# Patient Record
Sex: FEMALE | Race: WHITE | NOT HISPANIC OR LATINO | Employment: FULL TIME | URBAN - METROPOLITAN AREA
[De-identification: names, ages, dates, MRNs, and addresses within clinical notes are randomized per-mention and may not be internally consistent; named-entity substitution may affect disease eponyms.]

---

## 2017-01-06 ENCOUNTER — GENERIC CONVERSION - ENCOUNTER (OUTPATIENT)
Dept: OTHER | Facility: OTHER | Age: 62
End: 2017-01-06

## 2017-01-12 ENCOUNTER — HOSPITAL ENCOUNTER (OUTPATIENT)
Dept: MAMMOGRAPHY | Facility: CLINIC | Age: 62
Discharge: HOME | End: 2017-01-12
Payer: COMMERCIAL

## 2017-01-12 ENCOUNTER — GENERIC CONVERSION - ENCOUNTER (OUTPATIENT)
Dept: OTHER | Facility: OTHER | Age: 62
End: 2017-01-12

## 2017-01-12 DIAGNOSIS — Z12.31 ENCOUNTER FOR SCREENING MAMMOGRAM FOR MALIGNANT NEOPLASM OF BREAST: ICD-10-CM

## 2017-01-12 PROCEDURE — G0202 SCR MAMMO BI INCL CAD: HCPCS

## 2017-01-13 ENCOUNTER — GENERIC CONVERSION - ENCOUNTER (OUTPATIENT)
Dept: OTHER | Facility: OTHER | Age: 62
End: 2017-01-13

## 2017-01-14 ENCOUNTER — TRANSCRIBE ORDERS (OUTPATIENT)
Dept: ADMINISTRATIVE | Facility: HOSPITAL | Age: 62
End: 2017-01-14

## 2017-01-14 ENCOUNTER — APPOINTMENT (OUTPATIENT)
Dept: LAB | Facility: CLINIC | Age: 62
End: 2017-01-14
Payer: COMMERCIAL

## 2017-01-14 DIAGNOSIS — E55.9 UNSPECIFIED VITAMIN D DEFICIENCY: ICD-10-CM

## 2017-01-14 DIAGNOSIS — E55.9 UNSPECIFIED VITAMIN D DEFICIENCY: Primary | ICD-10-CM

## 2017-01-14 DIAGNOSIS — E55.9 VITAMIN D DEFICIENCY: ICD-10-CM

## 2017-01-14 DIAGNOSIS — M85.80 OTHER SPECIFIED DISORDERS OF BONE DENSITY AND STRUCTURE, UNSPECIFIED SITE: ICD-10-CM

## 2017-01-14 LAB
25(OH)D3 SERPL-MCNC: 37.2 NG/ML (ref 30–100)
ALBUMIN SERPL BCP-MCNC: 4 G/DL (ref 3.5–5)
ALP SERPL-CCNC: 94 U/L (ref 46–116)
ALT SERPL W P-5'-P-CCNC: 28 U/L (ref 12–78)
ANION GAP SERPL CALCULATED.3IONS-SCNC: 5 MMOL/L (ref 4–13)
AST SERPL W P-5'-P-CCNC: 10 U/L (ref 5–45)
BILIRUB SERPL-MCNC: 0.25 MG/DL (ref 0.2–1)
BUN SERPL-MCNC: 12 MG/DL (ref 5–25)
CALCIUM SERPL-MCNC: 8.9 MG/DL (ref 8.3–10.1)
CHLORIDE SERPL-SCNC: 107 MMOL/L (ref 100–108)
CO2 SERPL-SCNC: 28 MMOL/L (ref 21–32)
CREAT SERPL-MCNC: 0.72 MG/DL (ref 0.6–1.3)
GFR SERPL CREATININE-BSD FRML MDRD: >60 ML/MIN/1.73SQ M
GLUCOSE SERPL-MCNC: 81 MG/DL (ref 65–140)
POTASSIUM SERPL-SCNC: 3.8 MMOL/L (ref 3.5–5.3)
PROT SERPL-MCNC: 7 G/DL (ref 6.4–8.2)
PTH-INTACT SERPL-MCNC: 25.6 PG/ML (ref 14–72)
SODIUM SERPL-SCNC: 140 MMOL/L (ref 136–145)

## 2017-01-14 PROCEDURE — 36415 COLL VENOUS BLD VENIPUNCTURE: CPT

## 2017-01-14 PROCEDURE — 83970 ASSAY OF PARATHORMONE: CPT

## 2017-01-14 PROCEDURE — 82306 VITAMIN D 25 HYDROXY: CPT

## 2017-01-14 PROCEDURE — 80053 COMPREHEN METABOLIC PANEL: CPT

## 2017-02-10 ENCOUNTER — GENERIC CONVERSION - ENCOUNTER (OUTPATIENT)
Dept: OTHER | Facility: OTHER | Age: 62
End: 2017-02-10

## 2017-04-12 ENCOUNTER — GENERIC CONVERSION - ENCOUNTER (OUTPATIENT)
Dept: OTHER | Facility: OTHER | Age: 62
End: 2017-04-12

## 2017-06-02 ENCOUNTER — GENERIC CONVERSION - ENCOUNTER (OUTPATIENT)
Dept: OTHER | Facility: OTHER | Age: 62
End: 2017-06-02

## 2017-06-10 ENCOUNTER — LAB CONVERSION - ENCOUNTER (OUTPATIENT)
Dept: OTHER | Facility: OTHER | Age: 62
End: 2017-06-10

## 2017-06-10 LAB
25(OH)D3 SERPL-MCNC: 50 NG/ML (ref 30–100)
T4 FREE SERPL-MCNC: 1 NG/DL (ref 0.8–1.8)
TSH SERPL DL<=0.05 MIU/L-ACNC: 4.67 MIU/L (ref 0.4–4.5)

## 2017-06-26 ENCOUNTER — ALLSCRIPTS OFFICE VISIT (OUTPATIENT)
Dept: OTHER | Facility: OTHER | Age: 62
End: 2017-06-26

## 2017-09-21 ENCOUNTER — LAB CONVERSION - ENCOUNTER (OUTPATIENT)
Dept: OTHER | Facility: OTHER | Age: 62
End: 2017-09-21

## 2017-09-21 LAB — TSH SERPL DL<=0.05 MIU/L-ACNC: 3.99 MIU/L (ref 0.4–4.5)

## 2017-10-02 ENCOUNTER — ALLSCRIPTS OFFICE VISIT (OUTPATIENT)
Dept: OTHER | Facility: OTHER | Age: 62
End: 2017-10-02

## 2017-10-03 NOTE — PROGRESS NOTES
Assessment  1  Hyperlipidemia (272 4) (E78 5)   2  Hypertension (401 9) (I10)   3  Hypothyroidism (244 9) (E03 9)   4  Osteopenia (733 90) (M85 80)   5  Rosacea (695 3) (L71 9)   6  Vitamin D deficiency (268 9) (E55 9)   7  Headache (784 0) (R51)    Plan  Encounter for screening mammogram for malignant neoplasm of breast    · * MAMMO SCREENING BILATERAL W CAD; Status:Hold For - Scheduling; Requested  for:12Jan2018;   Hyperlipidemia    · (1) LIPID PANEL, FASTING; Status:Active; Requested for:27Mar2018;   Hypertension    · (1) CBC/PLT/DIFF; Status:Active; Requested for:27Mar2018;    · (1) COMPREHENSIVE METABOLIC PANEL; Status:Active; Requested for:27Mar2018;    · (1) URINALYSIS (will reflex a microscopy if leukocytes, occult blood, protein or nitrites are  not within normal limits); Status:Active; Requested for:27Mar2018;   Hypothyroidism    · (1) TSH WITH FT4 REFLEX; Status:Active; Requested for:27Mar2018;   Need for prophylactic vaccination and inoculation against influenza    · Fluzone Quadrivalent Intramuscular Suspension  Vitamin D deficiency    · (1) VITAMIN D 25-HYDROXY; Status:Active;  Requested for:27Mar2018;      Hypertension  Little bit high today but overall has been very good with verapamil diet and exercise    Reflux/cough  Controlled with Nexium    Hypothyroid  TSH is normal  Continue 75 MCG's daily for the next year      Chronic daily headache/and migraine  Patient is on Trokendi for prophylaxis and sumatriptan for rescue    Hyperlipidemia  Continue atorvastatin    Rosacea  Continue Minocin    Osteopenia  On calcium daily, exercise daily and vitamin D at thousand units daily  Last DEXA was in January 2017       Patient's next colonoscopy is due August 2019        Recheck in 6 months EKG that day  Mammogram prior on May 12, 2018 are later  Fasting blood work a week prior  [employer] sooner if needed      Chief Complaint  Pt presents here for a 6mth recheck for htn, hypothyroidism, lipids and vit d def;  due 08/2019done 12/2016due 11/2017due 01/12/2018due 09/2018      History of Present Illness  Patient is here for her recheck on her blood pressure at her thyroid blood work  days ago she fell while walking her dogs  Together they were 180 pounds  Older forwarded hurt her arm and her knee but she is okay      Review of Systems  Constitutional  No fever chills no fatigue no weight loss no weight gain    Mental status  No anxiety or depression and no sleep disturbances no changes in personality or emotional problems no suicidal or homicidal ideations    Eyes  No eye pain no red eyes no visual disturbances no discharge no eye itch    ENT  No earache no hearing loss nasal discharge no sore throat no hoarseness no postnasal drip     Cardio  No chest pain no palpitations no leg edema no claudication no dyspnea on exertion no nocturnal dyspnea    Respiratory  No shortness of breath or wheeze no cough no orthopnea no dyspnea on exertion no hemoptysis no sputum production    GI  No abdominal pain no nausea no vomiting no diarrhea or constipation no bloody stools no change in bowel habits no change in weight      no dysuria hematuria no pyuria no incontinence no pelvic pain    Musculoskeletal  No myalgias arthralgias no joint swelling or stiffness no limb pain or swelling    Skin  No rashes or lesions no itchiness and no wounds    Neuro  No headache dizziness lightheadedness syncope numbness paresthesias or confusion    Heme  No swollen glands no easy bruising        Active Problems  1  Atrophy of vagina (627 3) (N95 2)   2  Dyspareunia (625 0)   3  Encounter for routine gynecological examination with Papanicolaou smear of cervix   (V72 31,V76 2) (Z01 419)   4  Encounter for screening mammogram for malignant neoplasm of breast (V76 12)   (Z12 31)   5  Headache (784 0) (R51)   6  Hyperlipidemia (272 4) (E78 5)   7  Hypertension (401 9) (I10)   8  Hypothyroidism (244 9) (E03 9)   9   Need for prophylactic vaccination and inoculation against influenza (V04 81) (Z23)   10  Osteopenia (733 90) (M85 80)   11  Rosacea (695 3) (L71 9)   12  Vitamin D deficiency (268 9) (E55 9)    Past Medical History  1  History of Colonoscopy (Fiberoptic) Screening   2  History of Cough (786 2) (R05)   3  History of Decrease In Consciousness (780 09)   4  History of Dysuria (788 1) (R30 0)   5  History of Encounter for routine gynecological examination (V72 31) (Z01 419)   6  History of Encounter for routine gynecological examination (V72 31) (Z01 419)   7  History of Endometrial hyperplasia (621 30) (N85 00)   8  History of Foot Pain (Soft Tissue) (729 5)   9  History of Gastric ulcer (531 90) (K25 9)   10  History of  3 (V22 2) (Z33 1)   11  History of constipation (V12 79) (Z87 19)   12  History of fatigue (V13 89) (Z87 898)   13  History of fatigue (V13 89) (Z87 898)   14  History of hyperlipidemia (V12 29) (Z86 39)   15  History of hypertension (V12 59) (Z86 79)   16  History of migraine (V12 49) (Z86 69)   17  History of senile atrophic vaginitis (V13 29) (Z87 42)   18  History of Knee pain (719 46) (M25 569)   19  History of Mild cervical dysplasia (622 11) (N87 0)   20  History of Moderate dysplasia of cervix (KORTNEY II) (622 12) (N87 1)   21  History of Simple endometrial hyperplasia without atypia (621 31) (N85 01)   22  History of Strain (848 9)    Surgical History  1  History of Appendectomy   2  History of Cervix Cryosurgery   3  History of Diagnostic Esophagogastroduodenoscopy   4  History of Dilation And Curettage Of Cervical Stump   5  History of Knee Arthroscopy (Therapeutic)   6  History of Tubal Ligation    Family History  Mother    1  Family history of Asthma (V17 5)   2  Family history of Hypertension (V17 49)  Father    3  Family history of Alzheimer Disease   4  Family history of Diabetes Mellitus (V18 0)  Family History    5  Family history of Acute Myocardial Infarction (V17 3)   6  Family history of Alzheimer Disease   7   Family history of Asthma (V17 5)   8  Family history of Diabetes Mellitus (V18 0)   9  Family history of Hypertension (V17 49)   10  Family history of Reported Family History Of Ischemic Heart Disease    The family history was reviewed and updated today  Social History   · Always uses seat belt   · Being A Social Drinker   · Drinks caffeinated tea   ·    · Never a smoker   · No drug use   · No Yazidi beliefs   · Three children  The social history was reviewed and updated today  Current Meds   1  Atorvastatin Calcium 10 MG Oral Tablet; TAKE 1 TABLET BY MOUTH EVERY DAY AT   BEDTIME; Therapy: 74PWU6473 to (Last ML:26WRT1203)  Requested for: 26Jun2017 Ordered   2  Calcium 1200 3265-0747 MG-UNIT Oral Tablet Chewable; Take 1 tablet daily; Therapy: (Recorded:54Fai4427) to Recorded   3  Minocycline HCl - 100 MG Oral Capsule; take 1 capsule daily; Therapy: 91KJT3553 to (Evaluate:18Feb2018)  Requested for: 54Pft5755; Last   Rx:74Jyh1940 Ordered   4  NexIUM 40 MG Oral Capsule Delayed Release; TAKE 1 CAPSULE DAILY AS NEEDED    Requested for: 26Jun2017; Last JY:19IMU3583 Ordered   5  Omega-3 Fish Oil CAPS; take 1 capsule daily; Therapy: (Recorded:61Vnl3637) to Recorded   6  SUMAtriptan 20 MG/ACT Nasal Solution; Therapy: 90CLG3327 to Recorded   7  SUMAtriptan Succinate 100 MG Oral Tablet; Therapy: 86VSN9579 to (Evaluate:04Jun2015)  Requested for: 69ULJ7561 Recorded   8  Synthroid 75 MCG Oral Tablet; TAKE 1 TABLET DAILY; Therapy: 62EWY8896 to (Evaluate:16Sep2018)  Requested for: 65Cuo9882; Last   Rx:44Luk0942 Ordered   9  Trokendi  MG Oral Capsule Extended Release 24 Hour; take 1 capsule daily; Therapy: (Zakia Myrick) to Recorded   10  Verapamil HCl  MG Oral Capsule Extended Release 24 Hour; TAKE 1 CAPSULE    DAILY *MYLANMFR*;    Therapy: 23XZV6389 to (Evaluate:26Mar2018)  Requested for: 85QQP6251; Last    Rx:31Mar2017 Ordered   11  Vitamin D 1000 UNIT CAPS; TAKE 1 CAPSULE Daily;     Therapy: (253 3924) to Recorded   12  Womens One Daily TABS; TAKE 1 TABLET DAILY; Therapy: (Recorded:28Nov2016) to Recorded   13  Zoloft 50 MG Oral Tablet; Therapy: (Recorded:02Sep2016) to Recorded    The medication list was reviewed and updated today  Allergies  1  Midrin CAPS   2  Lisinopril TABS  3  Banana   4  Seasonal    Vitals  Vital Signs    Recorded: 01TIX5811 07:43AM   Heart Rate 68   Respiration 14   Systolic 549, RUE, Sitting   Diastolic 80, RUE, Sitting   Height 5 ft 1 in   Weight 158 lb 8 0 oz   BMI Calculated 29 95   BSA Calculated 1 71     Physical Exam  Constitutional  Appears healthy, Looks well, Appearance consistent with age    Mental Status  Alert, Oriented, Cooperative, Memory function normal , clean, and reasonable    Neck  No neck mass, No thyromegaly, Good carotid upstrokes bilaterally, trachea midline positive click    Respiratory  Breath sounds normal, No rales, No rhonchi, No wheezing, normal palpation    Cardiac   Regular rhythm without ectopy or murmur no S3-S4, no heave lift or thrill to palpation    Vascular  No leg edema, No pedal edema    Muscular skeletal  No clubbing cyanosis , muscle tone normal    Skin  No appreciable rashes or abnormal appearing lesions        Health Management  Encounter for screening mammogram for malignant neoplasm of breast   * MAMMO SCREENING BILATERAL W CAD; every 1 year; Last 20QFV3601; Next Due: 12Jan2018; Active  History of Colonoscopy (Fiberoptic) Screening   COLONOSCOPY; every 10 years; Last 87Psk5502; Next Due: 40Nqx9510; Active  Hypertension   EKG/ECG- POC; every 1 year; Last 21PVR3268; Next Due: 50QMG1402; Near Due    Future Appointments    Date/Time Provider Specialty Site   11/02/2017 08:15 AM ANNIE Coppola   [facility]st[facility]   Electronically signed by : Brayan Mckinnon DO; Oct  2 2017  8:05AM EST                       (Author)

## 2017-10-11 ENCOUNTER — GENERIC CONVERSION - ENCOUNTER (OUTPATIENT)
Dept: OTHER | Facility: OTHER | Age: 62
End: 2017-10-11

## 2017-11-02 ENCOUNTER — ALLSCRIPTS OFFICE VISIT (OUTPATIENT)
Dept: OTHER | Facility: OTHER | Age: 62
End: 2017-11-02

## 2017-11-03 NOTE — PROGRESS NOTES
Assessment  1  Atrophy of vagina (627 3) (N95 2)   2  Dyspareunia (625 0)   3  Osteopenia (733 90) (M85 80)   4  Encounter for screening mammogram for malignant neoplasm of breast (V76 12)   (Z12 31)   5  Encounter for routine gynecological examination (V72 31) (Z01 419)    Plan  Encounter for routine gynecological examination    · Follow-up visit in 1 year Evaluation and Treatment  Follow-up  Status: Hold For -  Scheduling  Requested for: 50PNI3344   Ordered; For: Encounter for routine gynecological examination; Ordered By: Vito Hoang Performed:  Due: 91HZR0525  Encounter for screening mammogram for malignant neoplasm of breast    · * MAMMO SCREENING BILATERAL W CAD; Status:Hold For - Scheduling; Requested  for:12Jan2018; Perform:[facility]; YYN:03FLE2764;QGZIZAY;UIE:QKFTYZOYE for screening mammogram for malignant neoplasm of breast; Ordered By:Bre Sharma; Discussion/Summary    #1  Yearly exam-Pap smear deferred, self breast awareness reviewed, calcium/vitamin D recommendations reviewed, mammogram request given, colonoscopy followup as per specialist History of mild to moderate dysplasia-this was back in 2005  Recent Pap smear with HPV testing from June 2014 was negative with negative Pap/HPV September 2016  Pap smear was deferred as per current guidelines with patient consentHistory of cystic hyperplasia of the endometrium-this is noted on biopsy November 2004  No atypical changes were noted year the patient was urged to call with any abnormal bleeding/postmenopausal bleeding  History of osteopenia-DEXA scan from October 2014 and repeat December 2016 demonstrated continued osteopenia with only slight decrease in bone density of 3%  Risk of major osteoporotic fracture 9 4% with hip fracture risk of 1 1% was noted, below recommendations for medical treatment  Recommend calcium, vitamin-D, weight-bearing exercise   Recommend repeat DEXA scan around December 2018Dyspareunia with atrophic vaginitis-improved with use of lubrication  She states that her  has medical issues and they are not really sexually active at this time  She declines any intervention  Other-patient with migraine headache today, likely resulting in her blood pressure being elevated  Suggested follow up with primary doctor in the near future  she will followup in one year or as needed  The patient has the current Goals: Reviewed  The patent has the current Barriers: None  Patient is able to Self-Care  PATIENT EDUCATION RECORD She was given the following educational materials: Calcium/vitamin-D sheet   Chief Complaint  1  Visit For: Preventive General Multisystem Exam    History of Present Illness  HPI: Patient was seen today for yearly exam  Please see assessment plan for details      Review of Systems    Constitutional: No fever, no chills, feels well, no tiredness, no recent weight gain or loss  ENT: no ear ache, no loss of hearing, no nosebleeds or nasal discharge, no sore throat or hoarseness  Cardiovascular: no complaints of slow or fast heart rate, no chest pain, no palpitations, no leg claudication or lower extremity edema  Respiratory: no complaints of shortness of breath, no wheezing, no dyspnea on exertion, no orthopnea or PND  Breasts: no complaints of breast pain, breast lump or nipple discharge  Gastrointestinal: no complaints of abdominal pain, no constipation, no nausea or diarrhea, no vomiting, no bloody stools  Genitourinary: no complaints of dysuria, no incontinence, no pelvic pain, no dysmenorrhea, no vaginal discharge or abnormal vaginal bleeding  Musculoskeletal: no complaints of arthralgia, no myalgia, no joint swelling or stiffness, no limb pain or swelling  Integumentary: no complaints of skin rash or lesion, no itching or dry skin, no skin wounds  Neurological: no complaints of headache, no confusion, no numbness or tingling, no dizziness or fainting  ROS reviewed  Active Problems  1  Atrophy of vagina (627 3) (N95 2)   2  Dyspareunia (625 0)   3  Encounter for routine gynecological examination with Papanicolaou smear of cervix   (V72 31,V76 2) (Z01 419)   4  Encounter for screening mammogram for malignant neoplasm of breast (V76 12)   (Z12 31)   5  Headache (784 0) (R51)   6  Hyperlipidemia (272 4) (E78 5)   7  Hypertension (401 9) (I10)   8  Hypothyroidism (244 9) (E03 9)   9  Need for prophylactic vaccination and inoculation against influenza (V04 81) (Z23)   10  Osteopenia (733 90) (M85 80)   11  Rosacea (695 3) (L71 9)   12  Vitamin D deficiency (268 9) (E55 9)    Past Medical History   · History of Colonoscopy (Fiberoptic) Screening   · History of Cough (786 2) (R05)   · History of Decrease In Consciousness (780 09)   · History of Dysuria (788 1) (R30 0)   · History of Encounter for routine gynecological examination (V72 31) (Z01 419)   · History of Endometrial hyperplasia (621 30) (N85 00)   · History of Foot Pain (Soft Tissue) (729 5)   · History of Gastric ulcer (531 90) (K25 9)   · History of  3 (V22 2) (Z33 1)   · History of constipation (V12 79) (Z87 19)   · History of fatigue (V13 89) (Z88 092)   · History of fatigue (V13 89) (W57 137)   · History of hyperlipidemia (V12 29) (Z86 39)   · History of hypertension (V12 59) (Z86 79)   · History of migraine (V12 49) (Z86 69)   · History of senile atrophic vaginitis (V13 29) (Z87 42)   · History of Knee pain (719 46) (M25 569)   · History of Mild cervical dysplasia (622 11) (N87 0)   · History of Moderate dysplasia of cervix (KORTNEY II) (622 12) (N87 1)   · History of Simple endometrial hyperplasia without atypia (621 31) (N85 01)   · History of Strain (848 9)    The active problems and past medical history were reviewed and updated today        Surgical History   · History of Appendectomy   · History of Cervix Cryosurgery   · History of Diagnostic Esophagogastroduodenoscopy   · History of Dilation And Curettage Of Cervical Stump   · History of Knee Arthroscopy (Therapeutic)   · History of Tubal Ligation    The surgical history was reviewed and updated today  Family History  Mother    · Family history of Asthma (V17 5)   · Family history of Hypertension (V17 49)  Father    · Family history of Alzheimer Disease   · Family history of Diabetes Mellitus (V18 0)  Family History    · Family history of Acute Myocardial Infarction (V17 3)   · Family history of Alzheimer Disease   · Family history of Asthma (V17 5)   · Family history of Diabetes Mellitus (V18 0)   · Family history of Hypertension (V17 49)   · Family history of Reported Family History Of Ischemic Heart Disease    The family history was reviewed and updated today  Social History   · Always uses seat belt   · Being A Social Drinker   · Drinks caffeinated tea   · Drinks 1 cup of hot tea a day  Drinks 2 oz glasses of iced tea a day  ·    · Never a smoker   · No drug use   · No Spiritism beliefs   · Three children  The social history was reviewed and updated today  The social history was reviewed and is unchanged  Current Meds   1  Atorvastatin Calcium 10 MG Oral Tablet; TAKE 1 TABLET BY MOUTH EVERY DAY AT   BEDTIME; Therapy: 29IEB7782 to (Last VY:26LQZ7155)  Requested for: 26Jun2017 Ordered   2  Calcium 1200 5198-4261 MG-UNIT Oral Tablet Chewable; Take 1 tablet daily; Therapy: (Recorded:90Mkw4441) to Recorded   3  Minocycline HCl - 100 MG Oral Capsule; take 1 capsule daily; Therapy: 24OVX1478 to (Evaluate:18Feb2018)  Requested for: 55Gny2676; Last   Rx:71Omw9045 Ordered   4  NexIUM 40 MG Oral Capsule Delayed Release; TAKE 1 CAPSULE DAILY AS NEEDED    Requested for: 26Jun2017; Last VT:13MMD5806 Ordered   5  Omega-3 Fish Oil CAPS; take 1 capsule daily; Therapy: (Recorded:74Iza3621) to Recorded   6  SUMAtriptan 20 MG/ACT Nasal Solution; Therapy: 73LCK2905 to Recorded   7  SUMAtriptan Succinate 100 MG Oral Tablet;    Therapy: 77GYG8154 to (Evaluate:04Jun2015)  Requested for: 15JAI1799 Recorded   8  Synthroid 75 MCG Oral Tablet; TAKE 1 TABLET DAILY; Therapy: 83QEU6367 to (Evaluate:51Kee0847)  Requested for: 75Vrf7363; Last   Rx:21Sep2017 Ordered   9  Trokendi  MG Oral Capsule Extended Release 24 Hour; take 1 capsule daily; Therapy: (Chana Aparicio) to Recorded   10  Verapamil HCl  MG Oral Capsule Extended Release 24 Hour; TAKE 1 CAPSULE    DAILY *MYLANMFR*;    Therapy: 39NVN8041 to (Evaluate:26Mar2018)  Requested for: 75KUY1425; Last    Rx:31Mar2017 Ordered   11  Vitamin D 1000 UNIT CAPS; TAKE 1 CAPSULE Daily; Therapy: (794 1672) to Recorded   12  Womens One Daily TABS; TAKE 1 TABLET DAILY; Therapy: (Recorded:28Nov2016) to Recorded   13  Zoloft 50 MG Oral Tablet; Therapy: (Recorded:02Sep2016) to Recorded    Allergies  1  Midrin CAPS   2  Lisinopril TABS  3  Banana   4  Seasonal    Vitals   Recorded: 84CWX1297 19:01OT   Systolic 941, LUE, Sitting   Diastolic 92, LUE, Sitting   Height 5 ft 1 in   Weight 161 lb 2 oz   BMI Calculated 30 44   BSA Calculated 1 72   LMP POSTMENOPAUSAL     Physical Exam    Constitutional   General appearance: No acute distress, well appearing and well nourished  Neck   Neck: Normal, supple, trachea midline, no masses  Thyroid: Normal, no thyromegaly  Genitourinary   External genitalia: Normal and no lesions appreciated  Vagina: Normal, no lesions or dryness appreciated  -- Mild to moderate atrophy, without erythema or lesions or discharge  Urethra: Normal     Urethral meatus: Normal     Bladder: Normal, soft, non-tender and no prolapse or masses appreciated  Cervix: Normal, no palpable masses  -- Mildly atrophic, without lesions or discharge or cervicitis  No Cervical motion tenderness  Uterus: Normal, non-tender, not enlarged, and no palpable masses  -- Anteverted, top-normal size, nontender, without mass     Adnexa/parametria: Normal, non-tender and no fullness or masses appreciated  Anus, perineum, and rectum: Normal sphincter tone, no masses, and no prolapse  Chest   Breasts: Normal and no dimpling or skin changes noted  Abdomen   Abdomen: Normal, non-tender, and no organomegaly noted  Liver and spleen: No hepatomegaly or splenomegaly  Examination for hernias: No hernias appreciated  Lymphatic   Palpation of lymph nodes in neck, axillae, groin and/or other locations: No lymphadenopathy or masses noted  Skin   Skin and subcutaneous tissue: Normal skin turgor and no rashes      Palpation of skin and subcutaneous tissue: Normal     Psychiatric   Orientation to person, place, and time: Normal     Mood and affect: Normal        Future Appointments    Date/Time Provider Specialty Site   04/02/2018 07:30 AM Justin Shah DO Family Medicine [address] [place] [address] Drive     Signatures   Electronically signed by : ANNIE Herr ; Nov 2 2017  8:39AM EST                       (Author)

## 2018-01-10 NOTE — PROGRESS NOTES
Assessment    1  Allergic rhinitis (477 9) (J30 9)   2  Headache (784 0) (R51)   3  Hyperlipidemia (272 4) (E78 5)   4  Hypertension (401 9) (I10)   5  Hypothyroidism (244 9) (E03 9)   6  Osteopenia (733 90) (M85 80)   7  Vitamin D deficiency (268 9) (E55 9)   8  Sore throat (462) (J02 9)    Plan  Hypothyroidism    · From  Synthroid 25 MCG Oral Tablet TAKE 1 TABLET DAILY To Synthroid 50  MCG Oral Tablet (Levothyroxine Sodium) TAKE 1 TABLET DAILY   · (1) TSH WITH FT4 REFLEX; Status:Active; Requested for:97Wyn1866; Sore throat    · Follow-up visit in 6 months Evaluation and Treatment  Follow-up EKG that day  Status:  Hold For - Scheduling  Requested for: 86EXW4632     #1 patient's cough it turns out to be reflux secondary to  stricture  She is now on Nexium and will have an EGD tomorrow for dilation of her esophagus  Patient has a sore throat now, and I asked her to call if this does not improve after her stricture is relieved  It should start improving in the next one or 2 weeks  If it doesn't seem to happen she should call her ear nose and throat  #2 patient's blood pressure is very good and stable with verapamil only  We will continue the same    #3 low work shows that her thyroid needs to be bumped up from 25-50 Âµg daily  Patient will do this in 3 months have her TSH done  We will call her with the results in a new prescription for Synthroid  #4 chronic daily headache and migraine headaches-sees neurology and is currently trying Trokendi and has sumatriptan as a rescue    #5 cholesterol is on simvastatin and diet  Her cholesterol numbers are good other than HDL because of not being able to walk as above  #6 rosacea uses Minocin    #7 osteopenia and vitamin D and calcium daily-again    Hopefully over time will go away so she can exercise to help build her bones  Last DEXA October 2014   We can check these every 3 or 4 years     Patient's next colonoscopy is due August 2019  Mammogram on her after December 11, 2016  Next Pap test like 2016 with Dr Rebecca Benítez    [facility] months [facility] the [facility]  Recheck in 6 months EKG that day to check blood pressure      Chief Complaint  Pt presents here for a recheck for htn, hypothyroidism, lipids and vit d def;    colon due 08/2019  EKG due 08/2016  mammo due 12/11/2016  pap due 06/2015, pt will schedule      History of Present Illness  Patient is here for six-month recheck  In the meantime for her cough she did see ear nose and throat and they recommended Nexium instead of omeprazole  As a result her cough definitely improved  Ear nose and throat wanted her to see GI  She had a stricture in her esophagus  She did that and actually had a barium swallow which showed a 50% stricture at her GE junction and retropulsion of the barium  Patient feels thickness and pain in her submandibular area and also up into her TM area  Tomorrow she is scheduled for the EGD with dilation  Patient also complaining of extreme fatigue but has been doing a lot of overtime  As a result she hasn't been able to walk like she usually has other than once or twice a week usually on the weekend because her  works        Review of Systems   Constitutional  No fever chills fatigue as above no weight loss no weight gain    Mental status  No anxiety or depression and no sleep disturbances no changes in personality or emotional problems no suicidal or homicidal ideations    Eyes  No eye pain no red eyes no visual disturbances no discharge no eye itch    ENT  No earache no hearing loss nasal discharge sore throat as above no hoarseness no postnasal drip     Cardio  No chest pain no palpitations no leg edema no claudication no dyspnea on exertion no nocturnal dyspnea    Respiratory  No shortness of breath or wheeze no cough no orthopnea no dyspnea on exertion no hemoptysis no sputum production    GI  No abdominal pain no nausea no vomiting no diarrhea or constipation no bloody stools no change in bowel habits no change in weight      no dysuria hematuria no pyuria no incontinence no pelvic pain    Musculoskeletal  No myalgias arthralgias no joint swelling or stiffness no limb pain or swelling    Skin  No rashes or lesions no itchiness and no wounds    Neuro  No headache dizziness lightheadedness syncope numbness paresthesias or confusion    Heme  No swollen glands no easy bruising        Active Problems    1  Allergic rhinitis (477 9) (J30 9)   2  Atrophy of vagina (627 3) (N95 2)   3  Dyspareunia (625 0)   4  Encounter for routine gynecological examination () (Z01 419)   5  Encounter for screening mammogram for malignant neoplasm of breast (V76 12)   (Z12 31)   6  Headache (784 0) (R51)   7  History of self breast exam   8  Hyperlipidemia (272 4) (E78 5)   9  Hypertension (401 9) (I10)   10  Hypothyroidism (244 9) (E03 9)   11  Need for prophylactic vaccination and inoculation against influenza (V04 81) (Z23)   12  Osteopenia (733 90) (M85 80)   13  Rosacea (695 3) (L71 9)   14  Vitamin D deficiency (268 9) (E55 9)    Past Medical History    1  History of Colonoscopy (Fiberoptic) Screening   2  History of Cough (786 2) (R05)   3  History of Decrease In Consciousness (780 09)   4  History of Dysuria (788 1) (R30 0)   5  History of Encounter for routine gynecological examination () (Z01 419)   6  History of Endometrial hyperplasia (621 30) (N85 00)   7  History of Foot Pain (Soft Tissue) (729 5)   8  History of Gastric ulcer (531 90) (K25 9)   9  History of  3 (V22 2) (Z33 1)   10  History of constipation (V12 79) (Z87 19)   11  History of fatigue (V13 89) (Z87 898)   12  History of fatigue (V13 89) (Z87 898)   13  History of hyperlipidemia (V12 29) (Z86 39)   14  History of hypertension (V12 59) (Z86 79)   15  History of migraine (V12 49) (Z86 69)   16  History of senile atrophic vaginitis (V13 29) (Z87 42)   17  History of Knee pain (719 46) (M25 569)   18   History of Mild cervical dysplasia (622 11) (N87 0)   19  History of Moderate dysplasia of cervix (KORTNEY II) (622 12) (N87 1)   20  History of Simple endometrial hyperplasia without atypia (621 31) (N85 01)   21  History of Strain (848 9)    Surgical History    1  History of Appendectomy   2  History of Cervix Cryosurgery   3  History of Diagnostic Esophagogastroduodenoscopy   4  History of Dilation And Curettage Of Cervical Stump   5  History of Knee Arthroscopy   6  History of Tubal Ligation    Family History  Mother    1  Family history of Asthma (V17 5)   2  Family history of Hypertension (V17 49)  Father    3  Family history of Alzheimer Disease   4  Family history of Diabetes Mellitus (V18 0)  Family History    5  Family history of Acute Myocardial Infarction (V17 3)   6  Family history of Alzheimer Disease   7  Family history of Asthma (V17 5)   8  Family history of Diabetes Mellitus (V18 0)   9  Family history of Hypertension (V17 49)   10  Family history of Reported Family History Of Ischemic Heart Disease    Social History    · Always uses seat belt   · Being A Social Drinker   · Drinks caffeinated tea   ·    · Never a smoker   · No drug use   · No Anabaptism beliefs   · Three children  The social history was reviewed and updated today  The social history was reviewed and is unchanged  Current Meds   1  Calcium 1200 2036-8046 MG-UNIT Oral Tablet Chewable; Take 1 tablet daily; Therapy: (Recorded:91Qns2011) to Recorded   2  Minocycline HCl - 100 MG Oral Capsule; take 1 capsule daily; Therapy: 66GDJ9836 to (Evaluate:17Gxk5846)  Requested for: 34MTA6625; Last   Rx:82Pnk6634 Ordered   3  NexIUM 40 MG Oral Capsule Delayed Release; TAKE 1 CAPSULE DAILY AS NEEDED; Therapy: (Recorded:48Giw8418) to Recorded   4  [place]-3 Fish Oil CAPS; take 1 capsule daily; Therapy: (Recorded:65Rdm2931) to Recorded   5  Simvastatin 20 MG Oral Tablet; TAKE 1 TABLET DAILY AT BEDTIME  Requested for:   74Vbh0880; Last Rx:62Kom2166 Ordered   6   SUMAtriptan 20 MG/ACT Nasal Solution; Therapy: 94GLI2526 to Recorded   7  SUMAtriptan Succinate 100 MG Oral Tablet; Therapy: 95QLS0290 to (Evaluate:04Jun2015)  Requested for: 23JRU3377 Recorded   8  Synthroid 25 MCG Oral Tablet; TAKE 1 TABLET DAILY; Therapy: 44TRZ1692 to (Evaluate:52Jxw7044)  Requested for: 77Eck0338; Last   Rx:09Exq1718 Ordered   9  Trokendi  MG Oral Capsule Extended Release 24 Hour; take 1 capsule daily; Therapy: (Johnella Foots) to Recorded   10  Verapamil HCl  MG Oral Capsule Extended Release 24 Hour; TAKE 1 CAPSULE    DAILY  Requested for: 36Akr3548; Last Rx:07Brj0228 Ordered   11  Vitamin D 1000 UNIT CAPS; TAKE 1 CAPSULE Daily; Therapy: (Recorded:06Mar2015) to Recorded    The medication list was reviewed and updated today  Allergies    1  Midrin CAPS   2  Lisinopril TABS    3  Banana   4   Seasonal    Vitals  Vital Signs [Data Includes: Current Encounter]    Recorded: [id number] 07:33AM   Heart Rate 84   Respiration 14   Systolic 333, RUE, Sitting   Diastolic 76, RUE, Sitting   Height 5 ft 1 5 in   Weight 163 lb 12 8 oz   BMI Calculated 30 45   BSA Calculated 1 75     Physical Exam   Constitutional  Appears healthy, Looks well, Appearance consistent with age    Mental Status  Alert, Oriented, Cooperative, Memory function normal , clean    Neck  No neck mass, No thyromegaly, Good carotid upstrokes bilaterally, trachea midline positive click    Respiratory  Breath sounds normal, No rales, No rhonchi, No wheezing, normal palpation    Cardiac   Regular rhythm without ectopy or murmur no S3-S4, no heave lift or thrill to palpation    Vascular  No leg edema, No pedal edema    Muscular skeletal  No clubbing cyanosis , muscle tone normal    Skin  No appreciable rashes or lesions        Results/Data  Results   (1) LIPID PANEL, FASTING 96YLM3556 06:06AM Jan Benjamin     Test Name Result Flag Reference   CHOLESTEROL, TOTAL 177 mg/dL  125-200   HDL CHOLESTEROL 43 mg/dL L > OR = 46 TRIGLICERIDES 766 mg/dL  <150   LDL-CHOLESTEROL 111 mg/dL (calc)  <130   Desirable range <100 mg/dL for patients with CHD or  diabetes and <70 mg/dL for diabetic patients with  known heart disease  CHOL/HDLC RATIO 4 1 (calc)  < OR = 5 0   NON HDL CHOLESTEROL 134 mg/dL (calc)     Target for non-HDL cholesterol is 30 mg/dL higher than   LDL cholesterol target  (1) CBC/PLT/DIFF 22BCH7406 06:06AM [facility]     Test Name Result Flag Reference   WHITE BLOOD CELL COUNT 6 3 Thousand/uL  3 8-10 8   RED BLOOD CELL COUNT 4 69 Million/uL  3 80-5 10   HEMOGLOBIN 14 1 g/dL  11 7-15 5   HEMATOCRIT 42 3 %  35 0-45 0   MCV 90 3 fL  80 0-100 0   MCH 30 1 pg  27 0-33 0   MCHC 33 4 g/dL  32 0-36 0   RDW 13 2 %  11 0-15 0   PLATELET COUNT 581 Thousand/uL  140-400   MPV 9 1 fL  7 5-11 5   ABSOLUTE NEUTROPHILS 2993 cells/uL  8355-3255   ABSOLUTE LYMPHOCYTES 2703 cells/uL  850-3900   ABSOLUTE MONOCYTES 573 cells/uL  200-950   ABSOLUTE EOSINOPHILS 0 cells/uL L    ABSOLUTE BASOPHILS 32 cells/uL  0-200   NEUTROPHILS 47 5 %     LYMPHOCYTES 42 9 %     MONOCYTES 9 1 %     EOSINOPHILS 0 0 %     BASOPHILS 0 5 %       (1) COMPREHENSIVE METABOLIC PANEL 09BDD9480 81:96VC [facility]     Test Name Result Flag Reference   GLUCOSE 93 mg/dL  65-99   Fasting reference interval   UREA NITROGEN (BUN) 16 mg/dL  7-25   CREATININE 0 70 mg/dL  0 50-0 99   For patients >52years of age, the reference limit  for Creatinine is approximately 13% higher for people  identified as -American  eGFR NON-AFR   AMERICAN 94 mL/min/1 73m2  > OR = 60   eGFR AFRICAN AMERICAN 108 mL/min/1 73m2  > OR = 60   BUN/CREATININE RATIO   1-53   NOT APPLICABLE (calc)   SODIUM 136 mmol/L  135-146   POTASSIUM 3 7 mmol/L  3 5-5 3   CHLORIDE 104 mmol/L     CARBON DIOXIDE 22 mmol/L  19-30   CALCIUM 9 1 mg/dL  8 6-10 4   PROTEIN, TOTAL 6 4 g/dL  6 1-8 1   ALBUMIN 4 3 g/dL  3 6-5 1   GLOBULIN 2 1 g/dL (calc)  1 9-3 7   ALBUMIN/GLOBULIN RATIO 2 0 (calc)  1 0-2 5 BILIRUBIN, TOTAL 0 4 mg/dL  0 2-1 2   ALKALINE PHOSPHATASE 82 U/L     AST 20 U/L  10-35   ALT 23 U/L  6-29     (1) URINALYSIS w URINE C/S REFLEX (will reflex a microscopy if leukocytes, occult blood, or nitrites are not within normal limits) [id number] 06:06AM Renetta Forman     Test Name Result Flag Reference   COLOR YELLOW  YELLOW   APPEARANCE CLEAR  CLEAR   SPECIFIC GRAVITY 1 013  1 001-1 035   PH 7 0  5 0-8 0   GLUCOSE NEGATIVE  NEGATIVE   BILIRUBIN NEGATIVE  NEGATIVE   KETONES NEGATIVE  NEGATIVE   OCCULT BLOOD NEGATIVE  NEGATIVE   PROTEIN NEGATIVE  NEGATIVE   NITRITE NEGATIVE  NEGATIVE   LEUKOCYTE ESTERASE TRACE A NEGATIVE   WBC 0-5 /HPF  < OR = 5   RBC 0-2 /HPF  < OR = 2   SQUAMOUS EPITHELIAL CELLS 0-5 /HPF  < OR = 5   BACTERIA NONE SEEN /HPF  NONE SEEN   HYALINE CAST NONE SEEN /LPF  NONE SEEN   NOTE See Below     This urine was analyzed for the presence of WBC,   RBC, bacteria, casts, and other formed elements  Only those elements seen were reported  REFLEXIVE URINE CULTURE      CULTURE INDICATED - RESULTS TO FOLLOW     (Q) TSH, 3RD GENERATION W/REFLEX TO FT4 [id number] 06:06AM Renetta Forman     Test Name Result Flag Reference   T4, FREE 1 0 ng/dL  0 8-1 8   TSH W/REFLEX TO FT4 5 50 mIU/L H 0 40-4 50     *(Q) VITAMIN D, 25-HYDROXY, LC/MS/MS [id number] 06:06AM Renetta Forman     Test Name Result Flag Reference   VITAMIN D, 25-OH, TOTAL 45 ng/mL     Vitamin D Status         25-OH Vitamin D:     Deficiency:                    <20 ng/mL  Insufficiency:             20 - 29 ng/mL  Optimal:                 > or = 30 ng/mL     For 25-OH Vitamin D testing on patients on   D2-supplementation and patients for whom quantitation   of D2 and D3 fractions is required, the QuestAssureD(TM)  25-OH VIT D, (D2,D3), LC/MS/MS is recommended: order   code 86313 (patients >2yrs)  For more information on this test, go to:  http://[employer]/faq/BWE990  (This link is being provided for informational/educational purposes only )     CULTURE, URINE, ROUTINE 55LPA2807 06:06AM Florence Deshpande   REPORT COMMENT:  FASTING:YES     Test Name Result Flag Reference   CULTURE, URINE, ROUTINE      CULTURE, URINE, ROUTINE         MICRO NUMBER:      [id number]    TEST STATUS:       FINAL    SPECIMEN SOURCE:   URINE    SPECIMEN QUALITY:  ADEQUATE    RESULT:            No Growth     * FL ESOPHAGRAM COMPLETE 26Jan2016 08:52AM EPIC, Provider   Test ordered by: Aisha Cooney     Test Name Result Flag Reference   FL ESOPHAGRAM COMPLETE (Report)     BARIUM SWALLOW-ESOPHAGRAM     INDICATION: Cough for 8 months  Recent improvement with the introduction of Nexium  COMPARISON: None     IMAGES: 17     FLUOROSCOPY TIME: 0 8 minutes     TECHNIQUE:   The patient was given effervescent granules and barium by mouth and images of the esophagus were obtained  FINDINGS:     Small hiatal hernia with stricture just above the hernia narrowing the lumen by approximately 50%  Reflux was noted during the exam with tertiary contractions and retropulsion of barium  IMPRESSION:     Small hiatal hernia with stricture just above the hernia narrowing the lumen by approximately 50%  Gastroesophageal reflux was noted during the exam with associated tertiary contractions and retropulsion of barium  ##sigslh##sigslh         Signed by: David Osman MD   1/26/16   [facility] W/CAD [id number] 08:35AM [facility]     Test Name Result Flag Reference   DIGTL SCREEN MAMMO W/CAD (Report)     [facility];[address];;[place];[address]   12/11/2015 0840   12/11/2015 0905       Patient History-   Patient is postmenopausal    No known family history of cancer  Patient has never smoked  Patient's BMI is 29 3  Reason for exam- screening (asymptomatic)  Digital Bilateral Screening Mammogram   Bilateral CC and MLO view(s) were taken       Technologist- Myles Bustillos TASIA Castro (TASIA)(M)   Prior study comparison- October 31, 2014, digital bilateral    screening mammogram performed at [facility] [address] [facility]  October 24, 2013, digital bilateral screening   mammogram performed at [address] [place] [address]  October 9, 2012, digital bilateral screening mammogram    performed at [facility] [address] [facility]  September 1, 2011, digital bilateral screening mammogram    performed at [facility] [address] [facility]  August 25, 2010, bilateral OPMA digital scrn mammo w/CAD,    performed at [address] [place] [address]  There are scattered fibroglandular densities  The parenchymal pattern appears stable  No dominant soft tissue    mass or suspicious calcifications are noted  The skin and nipple   contours are within normal limits  IMPRESSION-  No mammographic evidence of malignancy  No    significant changes when compared with prior studies  ASSESSMENT- BiRad-1 - Negative     Recommendation-   Routine screening mammogram in 1 year  A reminder letter will be   scheduled  Analyzed by CAD   8-10% of cancers will be missed on mammography  Management of a    palpable abnormality must be based on clinical grounds  Patients   will be notified of their results via letter from our facility  Accredited by [employer] of Radiology and FDA       Transcription Location- [place] [address])-   [place] 10 Year- 1 971%, [place] Lifetime- 4 819%,    Myriad Table- 1 5%, KELSEA 5 Year- 1 1%, NCI Lifetime- 5 2%     - PRANAV Park MD   [place] Radiologist- PRANAV Park MD   Electronically PRANAV Bennett MD   Released Date Time- 12/11/15 1000   ------------------------------------------------------------------------------   373^COY Bates     Health Management  Encounter for routine gynecological examination   (every) THINPREP PAP; every 2 years; Last 74TAK9334; Next Due: 88ZMA0020; Overdue  Encounter for screening mammogram for malignant neoplasm of breast   DIGTL SCRN MAMMO W/CAD; every 1 year; Last 43KAT6472; Next Due: 90Cir5714; Active  History of Colonoscopy (Fiberoptic) Screening   COLONOSCOPY; every 10 years; Last 33Rrn6729; Next Due: 69Hfg9876; Active  Hypertension   EKG/ECG- POC; every 1 year; Last 32Hhp7740; Next Due: 09Mwl8215;  Active    Signatures   Electronically signed by : Ally Manzo DO; May 24 2016  8:11AM EST                       (Author)

## 2018-01-11 NOTE — MISCELLANEOUS
Message   Recorded as Task   Date: 06/01/2017 08:56 PM, Created By: Yuko Garcia   Task Name: Call Back   Assigned To: Yuko Garcia   Regarding Patient: Lilibeth Villanueva, Status: Active   CommentSelina Ohm - 01 Jun 2017 8:56 PM     TASK CREATED  Call patient please  Received a request for her atorvastatin  30 pills sent  She was due a month or 2 ago for fasting blood work, orders are in the order section, and an office visit   15 minutes should be okay   Ghada Daniels - 02 Jun 2017 8:59 AM     TASK REPLIED TO: Previously Assigned To Levon Lowry  she will get labs done n/w then call to set appt        Signatures   Electronically signed by : Sahara Hutchins DO; Jun 2 2017 10:02AM EST                       (Author)

## 2018-01-12 VITALS
SYSTOLIC BLOOD PRESSURE: 128 MMHG | DIASTOLIC BLOOD PRESSURE: 80 MMHG | WEIGHT: 158.5 LBS | RESPIRATION RATE: 14 BRPM | BODY MASS INDEX: 29.92 KG/M2 | HEART RATE: 68 BPM | HEIGHT: 61 IN

## 2018-01-12 DIAGNOSIS — Z12.31 ENCOUNTER FOR SCREENING MAMMOGRAM FOR MALIGNANT NEOPLASM OF BREAST: ICD-10-CM

## 2018-01-13 VITALS
HEART RATE: 64 BPM | RESPIRATION RATE: 16 BRPM | HEIGHT: 61 IN | DIASTOLIC BLOOD PRESSURE: 78 MMHG | SYSTOLIC BLOOD PRESSURE: 118 MMHG | WEIGHT: 160.13 LBS | BODY MASS INDEX: 30.23 KG/M2

## 2018-01-14 VITALS
BODY MASS INDEX: 30.42 KG/M2 | DIASTOLIC BLOOD PRESSURE: 92 MMHG | SYSTOLIC BLOOD PRESSURE: 142 MMHG | HEIGHT: 61 IN | WEIGHT: 161.13 LBS

## 2018-01-15 NOTE — MISCELLANEOUS
Message   Recorded as Task   Date: 01/03/2017 12:21 PM, Created By: Los Mobley   Task Name: Go to Result   Assigned To: Siddhartha Sharma   Regarding Patient: Suhail Eagn, Status: In Progress   Comment:    Siddhartha Sharma - 03 Jan 2017 12:21 PM     TASK CREATED  DEXA scan with osteopenia, decreased density of 3% at the spine and hip compared with the last study  Consider laboratory testing with CMP, PTH, and 25 hydroxy vitamin D level  Recommend calcium, vitamin D, and weightbearing exercise  Recommend repeat DEXA scan in 2 years time   Quynh Castle - 04 Jan 2017 3:43 PM     TASK IN PROGRESS   CorrineQuynh - 06 Jan 2017 11:57 AM     TASK REPLIED TO: Previously Assigned To [facility],Team  script sent to pt for labs        Active Problems    1  Atrophy of vagina (627 3) (N95 2)   2  Dyspareunia (625 0)   3  Encounter for routine gynecological examination with Papanicolaou smear of cervix   (V72 31,V76 2) (Z01 419)   4  Encounter for screening mammogram for malignant neoplasm of breast (V76 12)   (Z12 31)   5  Headache (784 0) (R51)   6  History of self breast exam   7  Hyperlipidemia (272 4) (E78 5)   8  Hypertension (401 9) (I10)   9  Hypothyroidism (244 9) (E03 9)   10  Need for prophylactic vaccination and inoculation against influenza (V04 81) (Z23)   11  Osteopenia (733 90) (M85 80)   12  Rosacea (695 3) (L71 9)   13  Screening for genitourinary condition (V81 6) (Z13 89)   14  Screening for HPV (human papillomavirus) (V73 81) (Z11 51)   15  Vitamin D deficiency (268 9) (E55 9)    Current Meds   1  Atorvastatin Calcium 10 MG Oral Tablet; TAKE 1 TABLET AT BEDTIME; Therapy: 52ETF3996 to (Rubi Shay)  Requested for: 77SFG5283; Last   Rx:28Nov2016 Ordered   2  Calcium 1200 1869-9838 MG-UNIT Oral Tablet Chewable; Take 1 tablet daily; Therapy: (Recorded:36Cvn4182) to Recorded   3  Minocycline HCl - 100 MG Oral Capsule; take 1 capsule daily;    Therapy: 82BAL0133 to (Evaluate:57Sya6567)  Requested for: 03Nnj5881; Last   Rx:59Mvn7639 Ordered   4  NexIUM 40 MG Oral Capsule Delayed Release (Esomeprazole Magnesium); TAKE 1   CAPSULE DAILY AS NEEDED; Therapy: (Recorded:12Jan2016) to Recorded   5  [place]-3 Fish Oil CAPS; take 1 capsule daily; Therapy: (Recorded:26Liv1163) to Recorded   6  SUMAtriptan 20 MG/ACT Nasal Solution; Therapy: 03QWP3251 to Recorded   7  SUMAtriptan Succinate 100 MG Oral Tablet; Therapy: 65ZPE8469 to (Evaluate:04Jun2015)  Requested for: 82GDS6065 Recorded   8  Synthroid 50 MCG Oral Tablet (Levothyroxine Sodium); TAKE 1 TABLET DAILY; Therapy: 07VSI2558 to (Evaluate:03Aug2017)  Requested for: 79Lrg7915; Last   Rx:44Bfz6409 Ordered   9  Trokendi  MG Oral Capsule Extended Release 24 Hour; take 1 capsule daily; Therapy: (Nemo Berrios) to Recorded   10  Verapamil HCl  MG Oral Capsule Extended Release 24 Hour; TAKE 1 CAPSULE    DAILY *MYLANMFR*;    Therapy: 79YWM4657 to (Evaluate:17Sep2017)  Requested for: 35JWK6307; Last    Rx:99Rzx8275 Ordered   11  Vitamin D 1000 UNIT CAPS; TAKE 1 CAPSULE Daily; Therapy: (456 9424) to Recorded   12  Womens One Daily TABS; TAKE 1 TABLET DAILY; Therapy: (Recorded:28Nov2016) to Recorded   13  Zoloft 50 MG Oral Tablet (Sertraline HCl); Therapy: (Recorded:02Sep2016) to Recorded    Allergies    1  Midrin CAPS   2  Lisinopril TABS    3  Banana   4  Seasonal    Plan  Osteopenia, Vitamin D deficiency    · (1) COMPREHENSIVE METABOLIC PANEL; Status:Active - Retrospective Authorization; Requested LISSETTE:[id number];   Vitamin D deficiency    · (1) VITAMIN D 25-HYDROXY; Status:Active - Retrospective Authorization; Requested  VFU:13ASL4169;    · (Q) PTH, INTACT AND CALCIUM; Status:Active - Retrospective Authorization;  Requested  TKU:61SCT2823;     Signatures   Electronically signed by : Lashawn Neal, ; Jan 6 2017 11:58AM EST                       (Author)

## 2018-01-17 NOTE — MISCELLANEOUS
Message   Date: 13 Jan 2017 2:16 PM EST, Recorded By: Marvin Gomez For: Dimitri Pozo: Zaid Rivera, Self   Phone: [phone] (Home), [phone] (Work)   Reason: Other   Pt called questioning where to have Trav Bell work done  Advised check with insurance for lab approval         Active Problems    1  Atrophy of vagina (627 3) (N95 2)   2  Dyspareunia (625 0)   3  Encounter for routine gynecological examination with Papanicolaou smear of cervix   (V72 31,V76 2) (Z01 419)   4  Encounter for screening mammogram for malignant neoplasm of breast (V76 12)   (Z12 31)   5  Headache (784 0) (R51)   6  History of self breast exam   7  Hyperlipidemia (272 4) (E78 5)   8  Hypertension (401 9) (I10)   9  Hypothyroidism (244 9) (E03 9)   10  Need for prophylactic vaccination and inoculation against influenza (V04 81) (Z23)   11  Osteopenia (733 90) (M85 80)   12  Rosacea (695 3) (L71 9)   13  Screening for genitourinary condition (V81 6) (Z13 89)   14  Screening for HPV (human papillomavirus) (V73 81) (Z11 51)   15  Vitamin D deficiency (268 9) (E55 9)    Current Meds   1  Atorvastatin Calcium 10 MG Oral Tablet; TAKE 1 TABLET AT BEDTIME; Therapy: 16XFO9848 to (Humberto Gallego)  Requested for: 15BWQ4780; Last   Rx:28Nov2016 Ordered   2  Calcium 1200 7649-7192 MG-UNIT Oral Tablet Chewable; Take 1 tablet daily; Therapy: (Recorded:54Ikw1395) to Recorded   3  Minocycline HCl - 100 MG Oral Capsule; take 1 capsule daily; Therapy: 70EQH0671 to (Evaluate:13Udt4677)  Requested for: 75Znv4477; Last   Rx:26Tjy1866 Ordered   4  NexIUM 40 MG Oral Capsule Delayed Release (Esomeprazole Magnesium); TAKE 1   CAPSULE DAILY AS NEEDED; Therapy: (Recorded:12Jan2016) to Recorded   5  [place]-3 Fish Oil CAPS; take 1 capsule daily; Therapy: (Recorded:41Vkv5048) to Recorded   6  SUMAtriptan 20 MG/ACT Nasal Solution; Therapy: 93VBK4292 to Recorded   7  SUMAtriptan Succinate 100 MG Oral Tablet;    Therapy: 47Bdq9709 to (Evaluate:04Jun2015)  Requested for: 22EPU3923 Recorded   8  Synthroid 50 MCG Oral Tablet (Levothyroxine Sodium); TAKE 1 TABLET DAILY; Therapy: 06SGK9166 to (Evaluate:64Dqe4315)  Requested for: 42Lga3494; Last   Rx:79Kom3014 Ordered   9  Trokendi  MG Oral Capsule Extended Release 24 Hour; take 1 capsule daily; Therapy: (Angelina Chandler) to Recorded   10  Verapamil HCl  MG Oral Capsule Extended Release 24 Hour; TAKE 1 CAPSULE    DAILY *MYLANMFR*;    Therapy: 08XLU9987 to (Evaluate:17Sep2017)  Requested for: 64IYY9287; Last    Rx:[id number] Ordered   11  Vitamin D 1000 UNIT CAPS; TAKE 1 CAPSULE Daily; Therapy: ([phone]) to Recorded   12  Womens One Daily TABS; TAKE 1 TABLET DAILY; Therapy: (Recorded:28Nov2016) to Recorded   13  Zoloft 50 MG Oral Tablet (Sertraline HCl); Therapy: (Recorded:02Sep2016) to Recorded    Allergies    1  Midrin CAPS   2  Lisinopril TABS    3  Banana   4   Seasonal    Signatures   Electronically signed by : Inder Sin, ; Jan 13 2017  2:17PM EST                       (Author)

## 2018-02-05 RX ORDER — ATORVASTATIN CALCIUM 10 MG/1
10 TABLET, FILM COATED ORAL
Refills: 1 | COMMUNITY
Start: 2017-12-26 | End: 2018-04-20

## 2018-02-05 RX ORDER — ESOMEPRAZOLE MAGNESIUM 40 MG/1
40 CAPSULE, DELAYED RELEASE ORAL DAILY PRN
Refills: 1 | COMMUNITY
Start: 2017-11-10 | End: 2018-04-20 | Stop reason: SDUPTHER

## 2018-02-05 RX ORDER — MINOCYCLINE HYDROCHLORIDE 100 MG/1
100 CAPSULE ORAL DAILY
Refills: 3 | COMMUNITY
Start: 2017-11-28 | End: 2018-02-22 | Stop reason: SDUPTHER

## 2018-02-05 RX ORDER — TOPIRAMATE 200 MG/1
1 CAPSULE, EXTENDED RELEASE ORAL DAILY
Refills: 3 | COMMUNITY
Start: 2017-11-22 | End: 2018-02-05 | Stop reason: SDUPTHER

## 2018-02-05 RX ORDER — ACETAMINOPHEN 500 MG
1 TABLET ORAL DAILY
COMMUNITY
End: 2018-02-05 | Stop reason: SDUPTHER

## 2018-02-05 RX ORDER — SERTRALINE HYDROCHLORIDE 100 MG/1
TABLET, FILM COATED ORAL
Refills: 3 | COMMUNITY
Start: 2017-12-17

## 2018-02-05 RX ORDER — SUMATRIPTAN 20 MG/1
SPRAY NASAL
COMMUNITY
Start: 2014-12-05

## 2018-02-05 RX ORDER — ESOMEPRAZOLE MAGNESIUM 40 MG/1
CAPSULE, DELAYED RELEASE ORAL
Qty: 90 CAPSULE | Refills: 1 | OUTPATIENT
Start: 2018-02-05

## 2018-02-05 RX ORDER — CHLORAL HYDRATE 500 MG
1 CAPSULE ORAL DAILY
COMMUNITY
End: 2019-05-02 | Stop reason: ALTCHOICE

## 2018-02-05 RX ORDER — LEVOTHYROXINE SODIUM 0.07 MG/1
1 TABLET ORAL DAILY
COMMUNITY
Start: 2015-05-19 | End: 2018-10-27 | Stop reason: SDUPTHER

## 2018-02-05 RX ORDER — VERAPAMIL HYDROCHLORIDE 240 MG/1
CAPSULE, EXTENDED RELEASE ORAL
Refills: 3 | COMMUNITY
Start: 2017-12-18 | End: 2018-03-16 | Stop reason: SDUPTHER

## 2018-02-05 NOTE — TELEPHONE ENCOUNTER
Received a refill request for her esomeprazole/Nexium   I do not have a reason in her chart for why she is taking this    Please ask patient if she is taking this and   the reason

## 2018-02-16 ENCOUNTER — HOSPITAL ENCOUNTER (OUTPATIENT)
Dept: MAMMOGRAPHY | Facility: CLINIC | Age: 63
Discharge: HOME | End: 2018-02-16
Payer: COMMERCIAL

## 2018-02-16 DIAGNOSIS — Z12.31 ENCOUNTER FOR SCREENING MAMMOGRAM FOR MALIGNANT NEOPLASM OF BREAST: ICD-10-CM

## 2018-02-16 PROCEDURE — 77067 SCR MAMMO BI INCL CAD: CPT

## 2018-02-22 DIAGNOSIS — L71.9 ROSACEA: Primary | ICD-10-CM

## 2018-02-22 RX ORDER — MINOCYCLINE HYDROCHLORIDE 100 MG/1
CAPSULE ORAL
Qty: 90 CAPSULE | Refills: 3 | Status: SHIPPED | OUTPATIENT
Start: 2019-02-22 | End: 2019-02-13 | Stop reason: SDUPTHER

## 2018-03-16 DIAGNOSIS — I10 ESSENTIAL HYPERTENSION: Primary | ICD-10-CM

## 2018-03-16 RX ORDER — VERAPAMIL HYDROCHLORIDE 240 MG/1
CAPSULE, EXTENDED RELEASE ORAL
Qty: 90 CAPSULE | Refills: 1 | Status: SHIPPED | OUTPATIENT
Start: 2018-03-16 | End: 2018-09-05 | Stop reason: SDUPTHER

## 2018-03-24 LAB
25(OH)D3 SERPL-MCNC: 60 NG/ML (ref 30–100)
ALBUMIN SERPL-MCNC: 4.3 G/DL (ref 3.6–5.1)
ALBUMIN/GLOB SERPL: 2 (CALC) (ref 1–2.5)
ALP SERPL-CCNC: 69 U/L (ref 33–130)
ALT SERPL-CCNC: 16 U/L (ref 6–29)
APPEARANCE UR: CLEAR
AST SERPL-CCNC: 15 U/L (ref 10–35)
BACTERIA UR QL AUTO: NORMAL /HPF
BASOPHILS # BLD AUTO: 29 CELLS/UL (ref 0–200)
BASOPHILS NFR BLD AUTO: 0.5 %
BILIRUB SERPL-MCNC: 0.4 MG/DL (ref 0.2–1.2)
BILIRUB UR QL STRIP: NEGATIVE
BUN SERPL-MCNC: 14 MG/DL (ref 7–25)
BUN/CREAT SERPL: NORMAL (CALC) (ref 6–22)
CALCIUM SERPL-MCNC: 9.1 MG/DL (ref 8.6–10.4)
CHLORIDE SERPL-SCNC: 104 MMOL/L (ref 98–110)
CHOLEST SERPL-MCNC: 201 MG/DL
CHOLEST/HDLC SERPL: 5.2 (CALC)
CO2 SERPL-SCNC: 27 MMOL/L (ref 20–31)
COLOR UR: YELLOW
CREAT SERPL-MCNC: 0.76 MG/DL (ref 0.5–0.99)
EOSINOPHIL # BLD AUTO: 0 CELLS/UL (ref 15–500)
EOSINOPHIL NFR BLD AUTO: 0 %
ERYTHROCYTE [DISTWIDTH] IN BLOOD BY AUTOMATED COUNT: 13.1 % (ref 11–15)
GLOBULIN SER CALC-MCNC: 2.1 G/DL (CALC) (ref 1.9–3.7)
GLUCOSE SERPL-MCNC: 89 MG/DL (ref 65–99)
GLUCOSE UR QL STRIP: NEGATIVE
HCT VFR BLD AUTO: 41.9 % (ref 35–45)
HDLC SERPL-MCNC: 39 MG/DL
HGB BLD-MCNC: 14.4 G/DL (ref 11.7–15.5)
HGB UR QL STRIP: NEGATIVE
HYALINE CASTS #/AREA URNS LPF: NORMAL /LPF
KETONES UR QL STRIP: NEGATIVE
LDLC SERPL CALC-MCNC: 131 MG/DL (CALC)
LEUKOCYTE ESTERASE UR QL STRIP: NEGATIVE
LYMPHOCYTES # BLD AUTO: 2372 CELLS/UL (ref 850–3900)
LYMPHOCYTES NFR BLD AUTO: 40.9 %
MCH RBC QN AUTO: 31 PG (ref 27–33)
MCHC RBC AUTO-ENTMCNC: 34.4 G/DL (ref 32–36)
MCV RBC AUTO: 90.1 FL (ref 80–100)
MONOCYTES # BLD AUTO: 563 CELLS/UL (ref 200–950)
MONOCYTES NFR BLD AUTO: 9.7 %
NEUTROPHILS # BLD AUTO: 2836 CELLS/UL (ref 1500–7800)
NEUTROPHILS NFR BLD AUTO: 48.9 %
NITRITE UR QL STRIP: NEGATIVE
NONHDLC SERPL-MCNC: 162 MG/DL (CALC)
PH UR STRIP: 7 [PH] (ref 5–8)
PLATELET # BLD AUTO: 226 THOUSAND/UL (ref 140–400)
PMV BLD REES-ECKER: 10.5 FL (ref 7.5–12.5)
POTASSIUM SERPL-SCNC: 3.9 MMOL/L (ref 3.5–5.3)
PROT SERPL-MCNC: 6.4 G/DL (ref 6.1–8.1)
PROT UR QL STRIP: NEGATIVE
RBC # BLD AUTO: 4.65 MILLION/UL (ref 3.8–5.1)
RBC #/AREA URNS HPF: NORMAL /HPF
SL AMB EGFR AFRICAN AMERICAN: 97 ML/MIN/1.73M2
SL AMB EGFR NON AFRICAN AMERICAN: 83 ML/MIN/1.73M2
SODIUM SERPL-SCNC: 139 MMOL/L (ref 135–146)
SP GR UR STRIP: 1.01 (ref 1–1.03)
SQUAMOUS #/AREA URNS HPF: NORMAL /HPF
TRIGL SERPL-MCNC: 173 MG/DL
TSH SERPL-ACNC: 4.14 MIU/L (ref 0.4–4.5)
WBC # BLD AUTO: 5.8 THOUSAND/UL (ref 3.8–10.8)
WBC #/AREA URNS HPF: NORMAL /HPF

## 2018-04-17 NOTE — PROGRESS NOTES
ASSESSMENT/PLAN:      Hypertension   very good with verapamil diet and exercise     Reflux/cough  Controlled with Nexium, we will try 20 mg instead of 40 and see how she does over the next 6 months     Hypothyroid  TSH is normal  Continue 75 MCG's daily for the next year      Chronic daily headache/and migraine  Patient is on Trokendi for prophylaxis and sumatriptan for rescue     Hyperlipidemia  Much better on simvastatin then the atorvastatin so we will go back     Rosacea  Continue Minocin     Osteopenia  On calcium daily, exercise daily and vitamin D at thousand units daily  Last DEXA was in January 2017         Patient's next colonoscopy is due August 2019         Recheck in 6 months   Mammogram prior on May 12, 2018 are later        Health Maintenance   Topic Date Due    HIV SCREENING  1955    Hepatitis C Screening  1955    Depression Screening PHQ-9  06/26/2018    PAP SMEAR  09/02/2018    MAMMOGRAM  02/16/2019    COLONOSCOPY  08/14/2019    DXA SCAN  12/30/2019    DTaP,Tdap,and Td Vaccines (2 - Td) 08/24/2020    INFLUENZA VACCINE  Completed         Problem List as of 4/20/2018 Never Reviewed    Atrophy of vagina    Dyspareunia    Headache    Hyperlipidemia    Hypertension    Hypothyroidism    Osteopenia    Rosacea    Vitamin D deficiency            Subjective:   Chief Complaint   Patient presents with    Follow-up     Patient is here for her 6 month recheck and go over her blood work  She is also here for EKG  Last time we changed her simvastatin now for atorvastatin so we want to see what her cholesterol would do  Overall she is exercising somewhat but thinking about the future in terms of penitentiary most likely by the end of the year  Her company, [employer], is moving to [place]  Patient taking medications as directed  Would like to cut down on the dose of Nexium since she is on 40 to see if 20 will keep her cough down from her reflux          patient ID: Beatrice Pelayo is a 60 y o  female      Past Medical History:   Diagnosis Date    Decreased consciousness     Dysuria     Endometrial hyperplasia     Foot pain     (soft tissue)-last assessed-10/10/2013    Gastric ulcer     last assessed-2/16/2012    Hyperlipidemia     Hypertension     Migraine     Mild cervical dysplasia     Moderate dysplasia of cervix (KORTNEY II)     10/2005 colposcopy    Senile atrophic vaginitis     Simple endometrial hyperplasia without atypia     11/5/2004     Past Surgical History:   Procedure Laterality Date    APPENDECTOMY  1976    COLONOSCOPY      (fiberoptic) screening-last assessed-10/10/2013    DILATION AND CURETTAGE OF UTERUS  11/2004    dilation and curettage of cervical stump    ESOPHAGOGASTRODUODENOSCOPY  02/1998    diagnostic    GYNECOLOGIC CRYOSURGERY  11/2005    cervix cryosurgery    KNEE ARTHROSCOPY  02/2004    (therapeutic)    TUBAL LIGATION  1982     Family History   Problem Relation Age of Onset    Asthma Mother     Hypertension Mother     Alzheimer's disease Father     Diabetes Father     Heart attack Family      acute MI    Alzheimer's disease Family     Asthma Family     Diabetes Family     Hypertension Family      last assessed-2/16/2012    Heart disease Family      Ischemic heart disease    Mental illness Neg Hx     Substance Abuse Neg Hx      Social History   Substance Use Topics    Smoking status: Never Smoker    Smokeless tobacco: Never Used    Alcohol use Yes      Comment: social     History   Smoking Status    Never Smoker   Smokeless Tobacco    Never Used        MED LIST WAS REVIEWED AND UPDATED       ROS    Constitutional  No fever chills no fatigue no weight loss no weight gain    Mental status  No anxiety or depression and no sleep disturbances no changes in personality or emotional problems no suicidal or homicidal ideations    Eyes  No eye pain no red eyes no visual disturbances no discharge no eye itch    ENT  No earache no hearing loss nasal discharge no sore throat no hoarseness no postnasal drip     Cardio  No chest pain no palpitations no leg edema no claudication no dyspnea on exertion no nocturnal dyspnea    Respiratory  No shortness of breath or wheeze no cough no orthopnea no dyspnea on exertion no hemoptysis no sputum production    GI  No abdominal pain no nausea no vomiting no diarrhea or constipation no bloody stools no change in bowel habits no change in weight      no dysuria hematuria no pyuria no incontinence no pelvic pain    Musculoskeletal  No myalgias arthralgias no joint swelling or stiffness no limb pain or swelling    Skin  No rashes or lesions no itchiness and no wounds    Neuro  No headache dizziness lightheadedness syncope numbness paresthesias or confusion    Heme  No swollen glands no easy bruising          Objective:    EKG is normal with sinus bradycardia  Patient usually has heart rate in the 60s, she is on verapamil    Patient's cholesterol 1st number on simvastatin 2nd number on atorvastatin  Total cholesterol was 171 and is now 201  HDL was 38 and is still 38  Triglycerides was 145 is now 173  LDL was 104 and is now 131       VITALS:  Wt Readings from Last 3 Encounters:   04/20/18 71 3 kg (157 lb 3 2 oz)   11/02/17 73 1 kg (161 lb 2 1 oz)   10/02/17 71 9 kg (158 lb 8 oz)     BP Readings from Last 3 Encounters:   04/20/18 122/82   11/02/17 142/92   10/02/17 128/80     Pulse Readings from Last 3 Encounters:   04/20/18 60   10/02/17 68   06/26/17 64     Body mass index is 29 7 kg/m²      Laboratory Results:   Lab Results   Component Value Date    WBC 6 1 06/09/2017    WBC 0-5 06/09/2017    WBC 0-5 05/11/2016    WBC 6 3 05/11/2016    HGB 14 4 03/23/2018    HGB 14 4 06/09/2017    HGB 14 1 05/11/2016    HCT 41 9 03/23/2018    HCT 43 0 06/09/2017    HCT 42 3 05/11/2016    MCV 90 1 03/23/2018    MCV 90 8 06/09/2017    MCV 90 3 05/11/2016     03/23/2018     06/09/2017     05/11/2016     Lab Results Component Value Date     06/09/2017     01/14/2017     05/11/2016    K 4 3 06/09/2017    K 3 8 01/14/2017    K 3 7 05/11/2016     06/09/2017     01/14/2017     05/11/2016    CO2 26 06/09/2017    CO2 28 01/14/2017    CO2 22 05/11/2016    BUN 14 03/23/2018    BUN 15 06/09/2017    BUN 12 01/14/2017     Lab Results   Component Value Date    GLUCOSE 81 01/14/2017    ALT 18 06/09/2017    AST 15 06/09/2017    ALKPHOS 61 06/09/2017    EGFR >60 0 01/14/2017    CALCIUM 9 1 03/23/2018     No results found for: TSH    Lipid Profile:   Lab Results   Component Value Date    CHOL 171 06/09/2017    CHOL 177 05/11/2016    CHOL 203 (H) 05/13/2015     Lab Results   Component Value Date    HDL 38 (L) 06/09/2017    HDL 43 (L) 05/11/2016    HDL 53 05/13/2015     No results found for: [address]  Lab Results   Component Value Date    TRIG 173 (H) 03/23/2018    TRIG 145 06/09/2017    TRIG 113 05/11/2016       Diabetic labs (if applicable)  No results found for: HGBA1C  Lab Results   Component Value Date    CREATININE 0 76 03/23/2018      Physical Exam    Gen    No acute distress well-appearing well-nourished appears stated age    Mental status  Good judgment and insight oriented to time person and place, recent and remote memory intact mood and affect normal cooperative and patient is reasonable    HEENT  PERRLA 3 mm, EOMI without nystagmus, TMs clear, turbinates open pink no exudate, pharynx benign, tongue midline    Neck   supple no masses trachea midline positive click normal carotid upstrokes with no bruits    Cor  Regular rhythm without ectopy or murmur, no S3-S4, normal palpation that is no heave lift or thrill    Vascular  No edema, good pedal pulses    Lungs  CTA bilaterally in no respiratory distress no wheezes rhonchi or rales, normal to palpation no tactile fremitus    Abdomen  Soft, no palpable masses, no hepatosplenomegaly, normal bowel sounds, nontender    Lymphatics  No palpable nodes in the neck, supraclavicular area, axilla, or groin     Musculoskeletal  No clubbing cyanosis or edema muscle tone normal    Skin  no rashes or abnormal appearing lesions    Neuro  Normal ambulation, cranial nerves 2-12 grossly intact, higher functioning with reasoning intact

## 2018-04-20 ENCOUNTER — TELEPHONE (OUTPATIENT)
Dept: FAMILY MEDICINE CLINIC | Facility: CLINIC | Age: 63
End: 2018-04-20

## 2018-04-20 ENCOUNTER — OFFICE VISIT (OUTPATIENT)
Dept: FAMILY MEDICINE CLINIC | Facility: CLINIC | Age: 63
End: 2018-04-20
Payer: COMMERCIAL

## 2018-04-20 VITALS
WEIGHT: 157.2 LBS | HEART RATE: 60 BPM | RESPIRATION RATE: 16 BRPM | BODY MASS INDEX: 29.68 KG/M2 | SYSTOLIC BLOOD PRESSURE: 122 MMHG | HEIGHT: 61 IN | DIASTOLIC BLOOD PRESSURE: 82 MMHG

## 2018-04-20 DIAGNOSIS — L71.9 ROSACEA: ICD-10-CM

## 2018-04-20 DIAGNOSIS — E03.9 HYPOTHYROIDISM, UNSPECIFIED TYPE: ICD-10-CM

## 2018-04-20 DIAGNOSIS — M85.80 OSTEOPENIA, UNSPECIFIED LOCATION: ICD-10-CM

## 2018-04-20 DIAGNOSIS — E78.2 MIXED HYPERLIPIDEMIA: ICD-10-CM

## 2018-04-20 DIAGNOSIS — I10 HYPERTENSION, UNSPECIFIED TYPE: Primary | ICD-10-CM

## 2018-04-20 DIAGNOSIS — R05.9 COUGH: ICD-10-CM

## 2018-04-20 DIAGNOSIS — E55.9 VITAMIN D DEFICIENCY: ICD-10-CM

## 2018-04-20 PROCEDURE — 93000 ELECTROCARDIOGRAM COMPLETE: CPT | Performed by: FAMILY MEDICINE

## 2018-04-20 PROCEDURE — 3008F BODY MASS INDEX DOCD: CPT | Performed by: FAMILY MEDICINE

## 2018-04-20 PROCEDURE — 99215 OFFICE O/P EST HI 40 MIN: CPT | Performed by: FAMILY MEDICINE

## 2018-04-20 RX ORDER — SIMVASTATIN 20 MG
20 TABLET ORAL
Qty: 90 TABLET | Refills: 3 | Status: SHIPPED | OUTPATIENT
Start: 2018-04-20 | End: 2019-04-05 | Stop reason: SDUPTHER

## 2018-04-20 NOTE — TELEPHONE ENCOUNTER
Pharmacy received the new script for Simvastatin  Patient is on Verapamil and that is a drug/drug interaction  Pharmacy assumes that you are stopping the Atorvastatin the patient was on          Best number for Pharmacy:  [phone]

## 2018-04-20 NOTE — TELEPHONE ENCOUNTER
Yes I'm stopping the Atorvastatin  Yes I know she's on verapamil with the simvastatin, she has been for years and is fine

## 2018-04-20 NOTE — PATIENT INSTRUCTIONS
Hypertension   very good with verapamil diet and exercise     Reflux/cough  Controlled with Nexium, we will try 20 mg instead of 40 and see how she does over the next 6 months     Hypothyroid  TSH is normal  Continue 75 MCG's daily for the next year      Chronic daily headache/and migraine  Patient is on Trokendi for prophylaxis and sumatriptan for rescue     Hyperlipidemia  Much better on simvastatin then the atorvastatin so we will go back     Rosacea  Continue Minocin     Osteopenia  On calcium daily, exercise daily and vitamin D at thousand units daily  Last DEXA was in January 2017         Patient's next colonoscopy is due August 2019

## 2018-05-31 ENCOUNTER — TRANSCRIBE ORDERS (OUTPATIENT)
Dept: ADMINISTRATIVE | Facility: HOSPITAL | Age: 63
End: 2018-05-31

## 2018-05-31 DIAGNOSIS — G47.33 OBSTRUCTIVE SLEEP APNEA (ADULT) (PEDIATRIC): Primary | ICD-10-CM

## 2018-07-30 ENCOUNTER — HOSPITAL ENCOUNTER (OUTPATIENT)
Dept: SLEEP CENTER | Facility: HOSPITAL | Age: 63
Discharge: HOME | End: 2018-07-30
Payer: COMMERCIAL

## 2018-07-30 DIAGNOSIS — G47.33 OBSTRUCTIVE SLEEP APNEA (ADULT) (PEDIATRIC): ICD-10-CM

## 2018-07-30 PROCEDURE — 95810 POLYSOM 6/> YRS 4/> PARAM: CPT

## 2018-07-31 NOTE — PROGRESS NOTES
Sleep Study Documentation    Pre-Sleep Study       Sleep testing procedure explained to patient:YES    Patient napped prior to study:NO    Caffeine:Dayshift worker after 12PM   Caffeine use:NO    Alcohol:Dayshift workers after 5PM: Alcohol use:NO    Typical day for patient:YES       Study Documentation  Diagnostic   Snore:None  Supplemental O2: no    O2 flow rate (L/min) range   O2 flow rate (L/min) final   Minimum SaO2 89  Baseline SaO2 96        Mode of Therapy:    EKG abnormalities: no     EEG abnormalities: no    Study Terminated:no    Patient classification: employed       Post-Sleep Study    Medication used at bedtime or during sleep study:NO    Patient reports time it took to fall asleep:less than 20 minutes    Patient reports waking up during study:3 or more times  Patient reports returning to sleep without difficulty  Patient reports sleeping more than 8 hours with dreaming  Patient reports sleep during study:worse than usual    Patient rated sleepiness: Somewhat sleepy or tired    PAP treatment:no

## 2018-08-08 ENCOUNTER — TELEPHONE (OUTPATIENT)
Dept: SLEEP CENTER | Facility: CLINIC | Age: 63
End: 2018-08-08

## 2018-09-05 DIAGNOSIS — I10 ESSENTIAL HYPERTENSION: ICD-10-CM

## 2018-09-05 RX ORDER — VERAPAMIL HYDROCHLORIDE 240 MG/1
CAPSULE, EXTENDED RELEASE ORAL
Qty: 90 CAPSULE | Refills: 1 | Status: SHIPPED | OUTPATIENT
Start: 2018-09-05 | End: 2018-10-08

## 2018-10-08 ENCOUNTER — TELEPHONE (OUTPATIENT)
Dept: FAMILY MEDICINE CLINIC | Facility: CLINIC | Age: 63
End: 2018-10-08

## 2018-10-08 DIAGNOSIS — I10 ESSENTIAL HYPERTENSION: Primary | ICD-10-CM

## 2018-10-08 RX ORDER — VERAPAMIL HYDROCHLORIDE 240 MG/1
240 TABLET, FILM COATED, EXTENDED RELEASE ORAL
Qty: 90 TABLET | Refills: 0 | Status: SHIPPED | OUTPATIENT
Start: 2018-10-08 | End: 2019-01-04 | Stop reason: SDUPTHER

## 2018-10-08 NOTE — TELEPHONE ENCOUNTER
Patient is calling stating the capsules are on back order for the Verapamil and the pharmacy would like to know if they could change the Verapamil to tablets  Please send a script with the change to the [facility]

## 2018-10-21 NOTE — PROGRESS NOTES
ASSESSMENT/PLAN:    Seborrhea  Patient has a very tiny bit  She will use mometasone cream very sparingly rub it in once daily until it disappears  When it comes back patient will use it again     Hypertension  Had been controlled well on verapamil capsules  Most likely little high today secondary to girls weekend  We will keep this the same and patient will check her blood pressure    If the diastolic number stays above 80 she will let me know     Reflux/cough  Patient tried Nexium 20 and it is working so we will continue the same     Hypothyroid  Continue 75 MCG's daily  Check TSH before next      Chronic daily headache/and migraine  Patient stop Trokendi  Patient started Aimovig  Patient continues to follow with Neurology    Hyperlipidemia  Continue simvastatin  Did a trial of atorvastatin but did not work as well     Rosacea  Continue Minocin     Osteopenia  On calcium daily, exercise daily and vitamin D at thousand units daily  Last DEXA was in January 2017         Patient's next colonoscopy is due August 2019  Next mammogram 2/16/2019 or later         Recheck in 6 months, screening blood work which will be fasting with urine 1 week prior    Patient retires February 2019             Health Maintenance   Topic Date Due    INFLUENZA VACCINE  07/01/2018    PAP SMEAR  09/02/2018    MAMMOGRAM  02/16/2019    CRC Screening: Colonoscopy  08/14/2019    Depression Screening PHQ  10/22/2019    DXA SCAN  12/30/2019    DTaP,Tdap,and Td Vaccines (2 - Td) 08/24/2020         Problem List as of 10/22/2018 Reviewed: 4/20/2018 10:37 AM by Redd Null DO    Atrophy of vagina    Dyspareunia    Headache    Hyperlipidemia    Hypertension    Hypothyroidism    Obstructive sleep apnea (adult) (pediatric)    Osteopenia    Rosacea    Vitamin D deficiency            Subjective:   Chief Complaint   Patient presents with    Hyperlipidemia    Hypertension    Hypothyroidism    Vitamin D Deficiency     Patient is here for 6 month recheck on her blood pressure  She just had a girls weekend this past week and has today is Monday  Only complaint is some dry patchy skin on her forehead and also by her right nasal yossi  She is taking verapamil but that change from capsules 2 pills because of the shortage  She checks her blood pressure at home but not consistently    Since last visit she saw Neurology who stopped her Trokendi and started Aimovig  The rest of her medicines are as listed in review        patient ID: London Good is a 58 y o  female      Past Medical History:   Diagnosis Date    Decreased consciousness     Dysuria     Endometrial hyperplasia     Foot pain     (soft tissue)-last assessed-10/10/2013    Gastric ulcer     last assessed-2/16/2012    Hyperlipidemia     Hypertension     Migraine     Mild cervical dysplasia     Moderate dysplasia of cervix (KORTNEY II)     10/2005 colposcopy    Senile atrophic vaginitis     Simple endometrial hyperplasia without atypia     11/5/2004     Past Surgical History:   Procedure Laterality Date    APPENDECTOMY  1976    COLONOSCOPY      (fiberoptic) screening-last assessed-10/10/2013    DILATION AND CURETTAGE OF UTERUS  11/2004    dilation and curettage of cervical stump    ESOPHAGOGASTRODUODENOSCOPY  02/1998    diagnostic    GYNECOLOGIC CRYOSURGERY  11/2005    cervix cryosurgery    KNEE ARTHROSCOPY  02/2004    (therapeutic)    TUBAL LIGATION  1982     Family History   Problem Relation Age of Onset    Asthma Mother     Hypertension Mother     Alzheimer's disease Father     Diabetes Father     Heart attack Family         acute MI    Alzheimer's disease Family     Asthma Family     Diabetes Family     Hypertension Family         last assessed-2/16/2012    Heart disease Family         Ischemic heart disease    Alcohol abuse Brother     Mental illness Neg Hx     Substance Abuse Neg Hx      Social History   Substance Use Topics    Smoking status: Never Smoker    Smokeless tobacco: Never Used    Alcohol use Yes      Comment: social     History   Smoking Status    Never Smoker   Smokeless Tobacco    Never Used        MED LIST WAS REVIEWED AND UPDATED       ROS    Constitutional  No fever chills no fatigue no weight loss no weight gain    Mental status  No anxiety or depression and no sleep disturbances no changes in personality or emotional problems no suicidal or homicidal ideations    Eyes  No eye pain no red eyes no visual disturbances no discharge no eye itch    ENT  No earache no hearing loss nasal discharge no sore throat no hoarseness no postnasal drip     Cardio  No chest pain no palpitations no leg edema no claudication no dyspnea on exertion no nocturnal dyspnea    Respiratory  No shortness of breath or wheeze no cough no orthopnea no dyspnea on exertion no hemoptysis no sputum production    GI  No abdominal pain no nausea no vomiting no diarrhea or constipation no bloody stools no change in bowel habits no change in weight      no dysuria hematuria no pyuria no incontinence no pelvic pain    Musculoskeletal  No myalgias arthralgias no joint swelling or stiffness no limb pain or swelling    Skin  Skin as above    Neuro  No headache dizziness lightheadedness syncope numbness paresthesias or confusion    Heme  No swollen glands no easy bruising          Objective:      VITALS:  Wt Readings from Last 3 Encounters:   10/22/18 72 8 kg (160 lb 8 oz)   04/20/18 71 3 kg (157 lb 3 2 oz)   11/02/17 73 1 kg (161 lb 2 1 oz)     BP Readings from Last 3 Encounters:   10/22/18 132/88   04/20/18 122/82   11/02/17 142/92     Pulse Readings from Last 3 Encounters:   10/22/18 68   04/20/18 60   10/02/17 68     Body mass index is 30 08 kg/m²      Laboratory Results:   Lab Results   Component Value Date    WBC 5 8 03/23/2018    WBC 6 1 06/09/2017    WBC 0-5 06/09/2017    HGB 14 4 03/23/2018    HGB 14 4 06/09/2017    HGB 14 1 05/11/2016    HCT 41 9 03/23/2018    HCT 43 0 06/09/2017    HCT 42 3 05/11/2016    MCV 90 1 03/23/2018    MCV 90 8 06/09/2017    MCV 90 3 05/11/2016     03/23/2018     06/09/2017     05/11/2016     Lab Results   Component Value Date     06/09/2017     01/14/2017     05/11/2016    K 3 9 03/23/2018    K 4 3 06/09/2017    K 3 8 01/14/2017     03/23/2018     06/09/2017     01/14/2017    CO2 27 03/23/2018    CO2 26 06/09/2017    CO2 28 01/14/2017    BUN 14 03/23/2018    BUN 15 06/09/2017    BUN 12 01/14/2017     Lab Results   Component Value Date    ALT 18 06/09/2017    AST 15 06/09/2017    ALKPHOS 69 03/23/2018    EGFR >60 0 01/14/2017    CALCIUM 9 1 03/23/2018     No results found for: TSH    Lipid Profile:   Lab Results   Component Value Date    CHOL 171 06/09/2017    CHOL 177 05/11/2016    CHOL 203 (H) 05/13/2015     Lab Results   Component Value Date    HDL 39 (L) 03/23/2018    HDL 38 (L) 06/09/2017    HDL 43 (L) 05/11/2016     No results found for: [address]  Lab Results   Component Value Date    TRIG 173 (H) 03/23/2018    TRIG 145 06/09/2017    TRIG 113 05/11/2016       Diabetic labs (if applicable)  No results found for: HGBA1C  Lab Results   Component Value Date    CREATININE 0 70 06/09/2017          Physical Exam  Constitutional  Appears healthy, Looks well, Appearance consistent with age    Mental Status  Alert, Oriented, Cooperative, Memory function normal , clean, and reasonable    Neck  No neck mass, No thyromegaly, Good carotid upstrokes bilaterally, trachea midline positive click    Respiratory  Breath sounds normal, No rales, No rhonchi, No wheezing, normal palpation    Cardiac   Regular rhythm without ectopy or murmur no S3-S4, no heave lift or thrill to palpation    Vascular  No leg edema, No pedal edema    Muscular skeletal  No clubbing cyanosis , muscle tone normal    Skin  Less than 1 cm patch of dry slightly flaky skin above her right eye and some small pimples around her right nose

## 2018-10-22 ENCOUNTER — OFFICE VISIT (OUTPATIENT)
Dept: FAMILY MEDICINE CLINIC | Facility: CLINIC | Age: 63
End: 2018-10-22
Payer: COMMERCIAL

## 2018-10-22 VITALS
SYSTOLIC BLOOD PRESSURE: 132 MMHG | HEART RATE: 68 BPM | DIASTOLIC BLOOD PRESSURE: 88 MMHG | WEIGHT: 160.5 LBS | BODY MASS INDEX: 30.3 KG/M2 | HEIGHT: 61 IN | RESPIRATION RATE: 14 BRPM

## 2018-10-22 DIAGNOSIS — E55.9 VITAMIN D DEFICIENCY: ICD-10-CM

## 2018-10-22 DIAGNOSIS — Z23 NEED FOR PROPHYLACTIC VACCINATION AND INOCULATION AGAINST INFLUENZA: ICD-10-CM

## 2018-10-22 DIAGNOSIS — M85.89 OSTEOPENIA OF MULTIPLE SITES: ICD-10-CM

## 2018-10-22 DIAGNOSIS — L71.9 ROSACEA: ICD-10-CM

## 2018-10-22 DIAGNOSIS — E78.2 MIXED HYPERLIPIDEMIA: ICD-10-CM

## 2018-10-22 DIAGNOSIS — Z12.31 SCREENING MAMMOGRAM, ENCOUNTER FOR: ICD-10-CM

## 2018-10-22 DIAGNOSIS — E03.8 OTHER SPECIFIED HYPOTHYROIDISM: ICD-10-CM

## 2018-10-22 DIAGNOSIS — G47.33 OBSTRUCTIVE SLEEP APNEA (ADULT) (PEDIATRIC): ICD-10-CM

## 2018-10-22 DIAGNOSIS — I10 ESSENTIAL HYPERTENSION: Primary | ICD-10-CM

## 2018-10-22 DIAGNOSIS — L21.9 SEBORRHEA: ICD-10-CM

## 2018-10-22 DIAGNOSIS — G89.29 CHRONIC NONINTRACTABLE HEADACHE, UNSPECIFIED HEADACHE TYPE: ICD-10-CM

## 2018-10-22 DIAGNOSIS — R51.9 CHRONIC NONINTRACTABLE HEADACHE, UNSPECIFIED HEADACHE TYPE: ICD-10-CM

## 2018-10-22 PROCEDURE — 3079F DIAST BP 80-89 MM HG: CPT | Performed by: FAMILY MEDICINE

## 2018-10-22 PROCEDURE — 99214 OFFICE O/P EST MOD 30 MIN: CPT | Performed by: FAMILY MEDICINE

## 2018-10-22 PROCEDURE — 90682 RIV4 VACC RECOMBINANT DNA IM: CPT

## 2018-10-22 PROCEDURE — 3075F SYST BP GE 130 - 139MM HG: CPT | Performed by: FAMILY MEDICINE

## 2018-10-22 PROCEDURE — 90471 IMMUNIZATION ADMIN: CPT

## 2018-10-22 PROCEDURE — 3008F BODY MASS INDEX DOCD: CPT | Performed by: FAMILY MEDICINE

## 2018-10-22 RX ORDER — MOMETASONE FUROATE 1 MG/G
CREAM TOPICAL DAILY
Qty: 45 G | Refills: 0 | Status: SHIPPED | OUTPATIENT
Start: 2018-10-22

## 2018-10-22 NOTE — PATIENT INSTRUCTIONS
Seborrhea  Patient has a very tiny bit  She will use mometasone cream very sparingly rub it in once daily until it disappears  When it comes back patient will use it again     Hypertension  Had been controlled well on verapamil capsules  Most likely little high today secondary to girls weekend  We will keep this the same and patient will check her blood pressure    If the diastolic number stays above 80 she will let me know     Reflux/cough  Patient tried Nexium 20 and it is working so we will continue the same     Hypothyroid  Continue 75 MCG's daily  Check TSH before next      Chronic daily headache/and migraine  Patient stop Trokendi  Patient started Aimovig  Patient continues to follow with Neurology    Hyperlipidemia  Continue simvastatin  Did a trial of atorvastatin but did not work as well     Rosacea  Continue Minocin     Osteopenia  On calcium daily, exercise daily and vitamin D at thousand units daily  Last DEXA was in January 2017         Patient's next colonoscopy is due August 2019  Next mammogram 2/16/2019 or later         Recheck in 6 months, screening blood work which will be fasting with urine 1 week prior

## 2018-10-27 DIAGNOSIS — E03.9 HYPOTHYROIDISM, UNSPECIFIED TYPE: Primary | ICD-10-CM

## 2018-10-28 RX ORDER — LEVOTHYROXINE SODIUM 75 MCG
TABLET ORAL
Qty: 90 TABLET | Refills: 3 | Status: SHIPPED | OUTPATIENT
Start: 2018-10-28 | End: 2019-05-02 | Stop reason: SDUPTHER

## 2018-11-08 ENCOUNTER — ANNUAL EXAM (OUTPATIENT)
Dept: OBGYN CLINIC | Facility: CLINIC | Age: 63
End: 2018-11-08
Payer: COMMERCIAL

## 2018-11-08 VITALS
SYSTOLIC BLOOD PRESSURE: 124 MMHG | BODY MASS INDEX: 29.55 KG/M2 | HEIGHT: 62 IN | DIASTOLIC BLOOD PRESSURE: 82 MMHG | WEIGHT: 160.6 LBS

## 2018-11-08 DIAGNOSIS — N95.2 ATROPHY OF VAGINA: ICD-10-CM

## 2018-11-08 DIAGNOSIS — Z12.31 VISIT FOR SCREENING MAMMOGRAM: ICD-10-CM

## 2018-11-08 DIAGNOSIS — Z12.4 CERVICAL CANCER SCREENING: Primary | ICD-10-CM

## 2018-11-08 DIAGNOSIS — M85.89 OSTEOPENIA OF MULTIPLE SITES: ICD-10-CM

## 2018-11-08 DIAGNOSIS — Z01.419 WOMEN'S ANNUAL ROUTINE GYNECOLOGICAL EXAMINATION: ICD-10-CM

## 2018-11-08 DIAGNOSIS — N94.10 DYSPAREUNIA, FEMALE: ICD-10-CM

## 2018-11-08 PROCEDURE — G0145 SCR C/V CYTO,THINLAYER,RESCR: HCPCS | Performed by: OBSTETRICS & GYNECOLOGY

## 2018-11-08 PROCEDURE — 99396 PREV VISIT EST AGE 40-64: CPT | Performed by: OBSTETRICS & GYNECOLOGY

## 2018-11-08 NOTE — PATIENT INSTRUCTIONS
Vaginal Atrophy   WHAT YOU NEED TO KNOW:   What is vaginal atrophy? Vaginal atrophy is a condition that causes thinning, drying, and inflammation of vaginal tissue  This condition is caused by decreased levels of estrogen (a female sex hormone)  Vaginal atrophy can increase your risk for vaginal and urinary tract infections  Vaginal atrophy can worsen over time if not treated  What causes or increases your risk of vaginal atrophy? · Menopause     · Medicines that lower your estrogen levels, such as those used to treat breast cancer, endometriosis, or fibroids    · Radiation to your pelvic area     · Surgery to remove the ovaries    · Breastfeeding  What are the signs and symptoms of vaginal atrophy? · Vaginal dryness, itching, and burning    · Vaginal discharge    · Pain or discomfort during sex    · Light bleeding after sex    · Burning during urination    · Frequent, sudden, strong urges to urinate    · Urinary incontinence (loss of control of your bladder)  How is vaginal atrophy diagnosed? Your healthcare provider will ask about your symptoms  A pelvic exam will be done to examine your vagina and cervix  Your healthcare provider will place a speculum into your vagina to open and examine it  A sample of discharge from your vagina may be collected and tested  A urine test may also be done  How is vaginal atrophy treated? · Over-the counter vaginal moisturizers  can help reduce dryness  Your healthcare provider may recommend that you use a vaginal moisturizer several times each week and during sex  Only use creams that are made for vaginal use  Do  not  use petroleum jelly  Lubricants can be used during sex to decrease pain and discomfort  · Estrogen  may help decrease dryness  It may also lower your risk of vaginal infections if you are going through menopause  It can also help to relieve urinary symptoms  Estrogen may be prescribed in the form of a cream, tablet, or ring   These medicines can be applied or inserted into the vagina  Estrogen can also be prescribed in the form of a pill  When should I contact my healthcare provider? · You have a foul-smelling odor coming from your vagina  · You have a thick, cheese-like discharge from your vagina  · You have itching, swelling, or redness in your vagina  · You have pain or burning when you urinate  · Your urine smells bad  · Your symptoms do not improve, or they get worse  · You have questions or concerns about your condition or care  CARE AGREEMENT:   You have the right to help plan your care  Learn about your health condition and how it may be treated  Discuss treatment options with your caregivers to decide what care you want to receive  You always have the right to refuse treatment  The above information is an  only  It is not intended as medical advice for individual conditions or treatments  Talk to your doctor, nurse or pharmacist before following any medical regimen to see if it is safe and effective for you  © 2017 [employer] Information is for End User's use only and may not be sold, redistributed or otherwise used for commercial purposes  All illustrations and images included in CareNotes® are the copyrighted property of A D A M , Inc  or Cristhian James

## 2018-11-08 NOTE — PROGRESS NOTES
Assessment/Plan:  1  Yearly exam-Pap smear done with reflex HPV, self-breast awareness reviewed, calcium/vitamin-D recommendations discussed, mammogram request given, colonoscopy as per specialist   2  Previous history of mild to moderate dysplasia-back in 2005  Patient has had normal testing since then  Pap with HPV was negative June 2014 and September 2016  Pap was done today with reflex HPV  3  History of cystic hyperplasia of the endometrium-noted on biopsy November 2004  She has had no atypical changes since that time  She has had no postmenopausal bleeding  She will call with any such symptoms  4  History of osteopenia-the patient is status post DEXA scan October 2014 and December 2016, with slight decrease in density of 3% noted during that time  Risk of major osteoporosis fracture 9 4% with hip fracture risk of 1 1%, below treatment recommendations with medicine  Repeat DEXA scan was ordered today, post dated 12/30/18  She will continue with calcium and vitamin-D   5  Vaginal atrophy with dyspareunia-patient's partner continues to have some medical treatments which limits sexual activity  She was again counseled about vaginal lubrication/moisturizers and given the vaginal lubrication sheet  She was also briefly counseled about vaginal estrogen, Osphena, and vaginal DHEA-S  She declines them at this time but she will call with any issues  6  Other-patient is to retire in February 2019, as her company is moving to [place]  She plans to move to [facility] where she has a condo  My congratulations were given  She will follow up 1 year or as needed  No problem-specific Assessment & Plan notes found for this encounter  Diagnoses and all orders for this visit:    Cervical cancer screening  -     Liquid-based pap, screening    Osteopenia of multiple sites  -     DXA bone density spine hip and pelvis;  Future    Atrophy of vagina    Dyspareunia, female    Women's annual routine gynecological examination    Visit for screening mammogram  -     Mammo screening bilateral w cad; Future          Subjective:      Patient ID: Quique Ocampo is a 58 y o  female  Patient was seen today for yearly exam   Please see assessment plan for details  The following portions of the patient's history were reviewed and updated as appropriate: allergies, current medications, past family history, past medical history, past social history, past surgical history and problem list     Review of Systems   Constitutional: Negative for chills, diaphoresis, fatigue and fever  Respiratory: Negative for apnea, cough, chest tightness, shortness of breath and wheezing  Cardiovascular: Negative for chest pain, palpitations and leg swelling  Gastrointestinal: Negative for abdominal distention, abdominal pain, anal bleeding, constipation, diarrhea, nausea, rectal pain and vomiting  Genitourinary: Negative for difficulty urinating, dyspareunia, dysuria, frequency, hematuria, menstrual problem, pelvic pain, urgency, vaginal bleeding, vaginal discharge and vaginal pain  Musculoskeletal: Negative for arthralgias, back pain and myalgias  Skin: Negative for color change and rash  Neurological: Negative for dizziness, syncope, light-headedness, numbness and headaches  Hematological: Negative for adenopathy  Does not bruise/bleed easily  Psychiatric/Behavioral: Negative for dysphoric mood and sleep disturbance  The patient is not nervous/anxious  Objective:      /82 (BP Location: Left arm, Patient Position: Sitting, Cuff Size: Adult)   Ht 5' 1 5" (1 562 m)   Wt 72 8 kg (160 lb 9 6 oz)   LMP  (LMP Unknown)   Breastfeeding? No   BMI 29 85 kg/m²          Physical Exam    Objective      /82 (BP Location: Left arm, Patient Position: Sitting, Cuff Size: Adult)   Ht 5' 1 5" (1 562 m)   Wt 72 8 kg (160 lb 9 6 oz)   LMP  (LMP Unknown)   Breastfeeding?  No   BMI 29 85 kg/m²     General: alert and oriented, in no acute distress, alert, appears stated age and cooperative   Neck: normal to inspection and palpation   Breast: normal appearance, no masses or tenderness   Heart:    Lungs:    Abdomen: soft, non-tender, without masses or organomegaly   Vulva: normal   Vagina: Mildly atrophic, without erythema or lesions or discharge  Cervix: Mildly atrophic, without lesions or discharge or cervicitis    No Cervical motion tenderness   Uterus: top normal size, anteverted, non-tender   Adnexa: no mass, fullness, tenderness   Rectum: negative

## 2018-11-15 LAB
LAB AP GYN PRIMARY INTERPRETATION: NORMAL
Lab: NORMAL

## 2018-11-19 ENCOUNTER — TELEPHONE (OUTPATIENT)
Dept: OBGYN CLINIC | Facility: CLINIC | Age: 63
End: 2018-11-19

## 2019-01-04 DIAGNOSIS — I10 ESSENTIAL HYPERTENSION: ICD-10-CM

## 2019-01-04 RX ORDER — VERAPAMIL HYDROCHLORIDE 240 MG/1
TABLET, FILM COATED, EXTENDED RELEASE ORAL
Qty: 90 TABLET | Refills: 0 | Status: SHIPPED | OUTPATIENT
Start: 2019-01-04 | End: 2019-03-30 | Stop reason: SDUPTHER

## 2019-01-07 ENCOUNTER — HOSPITAL ENCOUNTER (OUTPATIENT)
Dept: MAMMOGRAPHY | Facility: CLINIC | Age: 64
Discharge: HOME | End: 2019-01-07
Payer: COMMERCIAL

## 2019-01-07 DIAGNOSIS — M85.89 OSTEOPENIA OF MULTIPLE SITES: ICD-10-CM

## 2019-01-07 DIAGNOSIS — I10 ESSENTIAL HYPERTENSION: ICD-10-CM

## 2019-01-07 PROCEDURE — 77080 DXA BONE DENSITY AXIAL: CPT

## 2019-01-08 RX ORDER — VERAPAMIL HYDROCHLORIDE 240 MG/1
TABLET, FILM COATED, EXTENDED RELEASE ORAL
Qty: 90 TABLET | Refills: 0 | OUTPATIENT
Start: 2019-01-08

## 2019-02-13 DIAGNOSIS — L71.9 ROSACEA: ICD-10-CM

## 2019-02-13 RX ORDER — MINOCYCLINE HYDROCHLORIDE 100 MG/1
CAPSULE ORAL
Qty: 90 CAPSULE | Refills: 3 | Status: SHIPPED | OUTPATIENT
Start: 2019-02-13 | End: 2020-02-12

## 2019-03-07 ENCOUNTER — HOSPITAL ENCOUNTER (OUTPATIENT)
Dept: MAMMOGRAPHY | Facility: CLINIC | Age: 64
Discharge: HOME | End: 2019-03-07
Payer: COMMERCIAL

## 2019-03-07 VITALS — WEIGHT: 160 LBS | BODY MASS INDEX: 29.44 KG/M2 | HEIGHT: 62 IN

## 2019-03-07 DIAGNOSIS — Z12.31 SCREENING MAMMOGRAM, ENCOUNTER FOR: ICD-10-CM

## 2019-03-07 PROCEDURE — 77067 SCR MAMMO BI INCL CAD: CPT

## 2019-03-30 DIAGNOSIS — I10 ESSENTIAL HYPERTENSION: ICD-10-CM

## 2019-04-01 RX ORDER — VERAPAMIL HYDROCHLORIDE 240 MG/1
TABLET, FILM COATED, EXTENDED RELEASE ORAL
Qty: 90 TABLET | Refills: 0 | Status: SHIPPED | OUTPATIENT
Start: 2019-04-01 | End: 2019-07-11 | Stop reason: SDUPTHER

## 2019-04-05 DIAGNOSIS — R05.9 COUGH: ICD-10-CM

## 2019-04-05 DIAGNOSIS — E78.2 MIXED HYPERLIPIDEMIA: ICD-10-CM

## 2019-04-05 RX ORDER — SIMVASTATIN 20 MG
TABLET ORAL
Qty: 90 TABLET | Refills: 3 | Status: SHIPPED | OUTPATIENT
Start: 2019-04-05

## 2019-05-02 ENCOUNTER — OFFICE VISIT (OUTPATIENT)
Dept: FAMILY MEDICINE CLINIC | Facility: CLINIC | Age: 64
End: 2019-05-02
Payer: COMMERCIAL

## 2019-05-02 VITALS
RESPIRATION RATE: 16 BRPM | WEIGHT: 155 LBS | BODY MASS INDEX: 29.27 KG/M2 | HEART RATE: 68 BPM | HEIGHT: 61 IN | SYSTOLIC BLOOD PRESSURE: 122 MMHG | DIASTOLIC BLOOD PRESSURE: 80 MMHG

## 2019-05-02 DIAGNOSIS — L21.9 SEBORRHEA: ICD-10-CM

## 2019-05-02 DIAGNOSIS — E78.2 MIXED HYPERLIPIDEMIA: Primary | ICD-10-CM

## 2019-05-02 DIAGNOSIS — I10 ESSENTIAL HYPERTENSION: ICD-10-CM

## 2019-05-02 DIAGNOSIS — L57.0 ACTINIC KERATOSIS: ICD-10-CM

## 2019-05-02 DIAGNOSIS — E03.8 OTHER SPECIFIED HYPOTHYROIDISM: ICD-10-CM

## 2019-05-02 DIAGNOSIS — E03.9 HYPOTHYROIDISM, UNSPECIFIED TYPE: ICD-10-CM

## 2019-05-02 DIAGNOSIS — M85.89 OSTEOPENIA OF MULTIPLE SITES: ICD-10-CM

## 2019-05-02 DIAGNOSIS — L71.9 ROSACEA: ICD-10-CM

## 2019-05-02 DIAGNOSIS — E55.9 VITAMIN D DEFICIENCY: ICD-10-CM

## 2019-05-02 PROCEDURE — 99214 OFFICE O/P EST MOD 30 MIN: CPT | Performed by: FAMILY MEDICINE

## 2019-05-02 PROCEDURE — 99396 PREV VISIT EST AGE 40-64: CPT | Performed by: FAMILY MEDICINE

## 2019-05-02 RX ORDER — LEVOTHYROXINE SODIUM 75 MCG
75 TABLET ORAL DAILY
Qty: 90 TABLET | Refills: 3 | Status: SHIPPED | OUTPATIENT
Start: 2019-05-02

## 2019-07-01 DIAGNOSIS — I10 ESSENTIAL HYPERTENSION: ICD-10-CM

## 2019-07-01 RX ORDER — VERAPAMIL HYDROCHLORIDE 240 MG/1
TABLET, FILM COATED, EXTENDED RELEASE ORAL
Qty: 90 TABLET | Refills: 0 | OUTPATIENT
Start: 2019-07-01

## 2019-07-11 ENCOUNTER — TELEPHONE (OUTPATIENT)
Dept: FAMILY MEDICINE CLINIC | Facility: CLINIC | Age: 64
End: 2019-07-11

## 2019-07-11 DIAGNOSIS — I10 ESSENTIAL HYPERTENSION: ICD-10-CM

## 2019-07-11 RX ORDER — VERAPAMIL HYDROCHLORIDE 240 MG/1
240 TABLET, FILM COATED, EXTENDED RELEASE ORAL
Qty: 90 TABLET | Refills: 3 | Status: SHIPPED | OUTPATIENT
Start: 2019-07-11

## 2020-04-23 ENCOUNTER — TELEPHONE (OUTPATIENT)
Dept: FAMILY MEDICINE CLINIC | Facility: CLINIC | Age: 65
End: 2020-04-23

## 2022-09-25 NOTE — TELEPHONE ENCOUNTER
Patient is calling asking for a 90 day script for Verapamil to be sent to [facility] [place], [place]  Any questions call [phone]  advance diet as tolerated	  Patient with periods slow svt. She has pancreatitis Bp was low. Better with fluid. May need out patient holter or MCOT  65 yo f pmhx bipolar mood disorder presents to ER c/o urinary symptoms s/p recent UTI p/w pancreatitis, ?duodenitis and transaminitis     CLEAR LIQUID DIET STARTED   Indistinct pancreatic head hypodensity, possibly due to mild focal   interstitial edematous pancreatitis. In addition, there are subtle   findings of right upper quadrant inflammatory change including mild   gallbladder wall thickening and circumferential duodenal thickening that   may be seen with duodenitis or secondary to pancreatic inflammation.  If patient's symptomatology persists, follow-up CT with intravenous   contrast is recommended to further evaluate the pancreas.  transaminitis    transaminitis   - IVF resuscitation   - bowel rest   - pain control   - GI consult   - abdominal US unremarkable   - duodenitis on CT   - f/u labs, including hepatic panel, hepatitis panel, igg4, lipase   - trend lipase  d/c macrobid  UTI START AUGMENTIN 875MG PO Q 12HRS  #recent UTI   - f/u UA/urine culture     mildly elevated triglycerides  (Trig 189 - unlikely cause of pancreatitis)  normal caliber CBD  ddx r/o CBD stone, possibly DILI  Rec  -Avoid hepatotoxic medications  -to consider EUS vs MRI with and without contrast pancreatic protocol when pancreatitis improves  -will call advanced GI for approval  -Monitor LFTs daily   a 65 yo f pmhx bipolar mood disorder presents to ER c/o urinary symptoms. pt states on Sunday she felt tired and having discomfort while urinating, presented to clinic and was sent home on nitrofurantion. Pt took antibiotics x 4 days with minimal relief.This am she had SVT rate 135 BP. She had no symptoms. She does not know if she ssnores, She has rare dreams, Possible DEANN, Will give iv Amiodarone. Probably no po, Out patient holter 1- 2 weeks. Echo can do out patient. Consider sleep study, Rx UTI

## 2024-08-12 ENCOUNTER — TELEPHONE (OUTPATIENT)
Age: 69
End: 2024-08-12

## 2024-08-12 ENCOUNTER — OFFICE VISIT (OUTPATIENT)
Dept: INTERNAL MEDICINE CLINIC | Facility: OTHER | Age: 69
End: 2024-08-12
Payer: COMMERCIAL

## 2024-08-12 VITALS
HEIGHT: 60 IN | DIASTOLIC BLOOD PRESSURE: 76 MMHG | WEIGHT: 180 LBS | TEMPERATURE: 98 F | BODY MASS INDEX: 35.34 KG/M2 | SYSTOLIC BLOOD PRESSURE: 130 MMHG | OXYGEN SATURATION: 94 % | HEART RATE: 74 BPM

## 2024-08-12 DIAGNOSIS — I69.154 HEMIPARESIS OF LEFT NONDOMINANT SIDE AS LATE EFFECT OF NONTRAUMATIC INTRACEREBRAL HEMORRHAGE (HCC): ICD-10-CM

## 2024-08-12 DIAGNOSIS — E55.9 VITAMIN D DEFICIENCY: ICD-10-CM

## 2024-08-12 DIAGNOSIS — R73.01 IMPAIRED FASTING GLUCOSE: ICD-10-CM

## 2024-08-12 DIAGNOSIS — M81.0 AGE-RELATED OSTEOPOROSIS WITHOUT CURRENT PATHOLOGICAL FRACTURE: ICD-10-CM

## 2024-08-12 DIAGNOSIS — E03.9 ACQUIRED HYPOTHYROIDISM: ICD-10-CM

## 2024-08-12 DIAGNOSIS — G43.009 MIGRAINE WITHOUT AURA AND WITHOUT STATUS MIGRAINOSUS, NOT INTRACTABLE: ICD-10-CM

## 2024-08-12 DIAGNOSIS — Z11.59 NEED FOR HEPATITIS C SCREENING TEST: ICD-10-CM

## 2024-08-12 DIAGNOSIS — Z76.89 ENCOUNTER TO ESTABLISH CARE: Primary | ICD-10-CM

## 2024-08-12 DIAGNOSIS — I69.30 HISTORY OF HEMORRHAGIC CEREBROVASCULAR ACCIDENT (CVA) WITH RESIDUAL DEFICIT: ICD-10-CM

## 2024-08-12 DIAGNOSIS — Z12.31 ENCOUNTER FOR SCREENING MAMMOGRAM FOR MALIGNANT NEOPLASM OF BREAST: ICD-10-CM

## 2024-08-12 DIAGNOSIS — F32.1 CURRENT MODERATE EPISODE OF MAJOR DEPRESSIVE DISORDER WITHOUT PRIOR EPISODE (HCC): ICD-10-CM

## 2024-08-12 DIAGNOSIS — I10 ESSENTIAL HYPERTENSION: ICD-10-CM

## 2024-08-12 DIAGNOSIS — E78.2 MIXED HYPERLIPIDEMIA: ICD-10-CM

## 2024-08-12 PROBLEM — I87.2 VENOUS INSUFFICIENCY: Status: ACTIVE | Noted: 2024-08-12

## 2024-08-12 PROBLEM — Z86.718 HISTORY OF BLOOD CLOTS: Status: ACTIVE | Noted: 2024-08-12

## 2024-08-12 PROBLEM — K21.9 GERD (GASTROESOPHAGEAL REFLUX DISEASE): Status: ACTIVE | Noted: 2024-08-12

## 2024-08-12 PROBLEM — G43.909 MIGRAINES: Status: ACTIVE | Noted: 2024-08-12

## 2024-08-12 PROBLEM — M17.32 POST-TRAUMATIC OSTEOARTHRITIS OF LEFT KNEE: Status: ACTIVE | Noted: 2024-08-12

## 2024-08-12 PROCEDURE — 99204 OFFICE O/P NEW MOD 45 MIN: CPT

## 2024-08-12 RX ORDER — LEVETIRACETAM 500 MG/1
500 TABLET ORAL EVERY 12 HOURS SCHEDULED
COMMUNITY

## 2024-08-12 RX ORDER — ATOGEPANT 60 MG/1
60 TABLET ORAL DAILY
Qty: 30 TABLET | Refills: 1 | Status: SHIPPED | OUTPATIENT
Start: 2024-08-12

## 2024-08-12 RX ORDER — ALENDRONATE SODIUM 70 MG/1
70 TABLET ORAL
COMMUNITY
Start: 2024-05-06 | End: 2024-08-12 | Stop reason: SDUPTHER

## 2024-08-12 RX ORDER — AMLODIPINE BESYLATE 5 MG/1
5 TABLET ORAL DAILY
COMMUNITY

## 2024-08-12 RX ORDER — PROPRANOLOL HYDROCHLORIDE 40 MG/1
40 TABLET ORAL 2 TIMES DAILY
COMMUNITY
Start: 2024-07-09

## 2024-08-12 RX ORDER — ALENDRONATE SODIUM 70 MG/1
70 TABLET ORAL
Qty: 90 TABLET | Refills: 1 | Status: SHIPPED | OUTPATIENT
Start: 2024-08-12

## 2024-08-12 RX ORDER — OYSTER SHELL CALCIUM WITH VITAMIN D 500; 200 MG/1; [IU]/1
1 TABLET, FILM COATED ORAL
COMMUNITY

## 2024-08-12 RX ORDER — ASPIRIN 81 MG/1
81 TABLET ORAL DAILY
COMMUNITY

## 2024-08-12 RX ORDER — MULTIVITAMIN WITH IRON
1 TABLET ORAL DAILY
COMMUNITY

## 2024-08-12 RX ORDER — ATOGEPANT 60 MG/1
60 TABLET ORAL DAILY
COMMUNITY
Start: 2024-06-18 | End: 2024-08-12 | Stop reason: SDUPTHER

## 2024-08-12 NOTE — PROGRESS NOTES
Ambulatory Visit  Name: Quynh Larios      : 1955      MRN: [id number]  Encounter Provider: Humaira Allen PA-C  Encounter Date: 2024   Encounter department: [facility] [place]    Assessment & Plan   1. Encounter to establish care  Comments:  Past medical history, meds, allergies, surgical history reviewed  2. 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 Current moderate episode of major depressive disorder without prior episode (HCC)  Assessment & Plan:  Controlled on Zoloft 50 mg daily  4. Essential hypertension  Assessment & Plan:  Controlled with amlodipine 5 mg daily and propranolol 40 mg twice daily  Orders:  -     TSH, 3rd generation with Free T4 reflex; Future  -     Comprehensive metabolic panel; Future  -     CBC and differential; Future  5. Acquired hypothyroidism  Assessment & Plan:  Last TSH WNL, currently on Synthroid 75 mcg daily  Will get updated TSH  6. Age-related osteoporosis without current pathological fracture  Assessment & Plan:  Continue vitamin D and calcium supplementation  Continue weightbearing exercise  Update DEXA scan  Orders:  -     DXA bone density spine hip and pelvis; Future; Expected date: 2024  -     alendronate (FOSAMAX) 70 mg tablet; Take 1 tablet (70 mg total) by mouth every 7 days TAKE 1 TABLET (70 MG TOTAL) BY MOUTH ONCE A WEEK TAKE IN THE MORNING WITH A FULL GLASS OF WATER, ON AN EMPTY STOMACH, AND DO NOT TAKE ANYTHING ELSE BY MOUTH OR LIE DOWN FOR THE NEXT 30 MIN  7. History of hemorrhagic cerebrovascular accident (CVA) with residual deficit  Assessment & Plan:  2021  Currently on aspirin 81 mg daily and simvastatin 40 mg  daily  She is on Keppra for seizure prophylaxis.  500 mg twice daily per last neurology note  Noted strokelike symptoms noted  Will refer to neurology  Orders:  -     Ambulatory Referral to Neurology; Future  8. Mixed hyperlipidemia  Assessment & Plan:  Continue simvastatin 40mg daily  Will get updated lipid panel  Orders:  -     Lipid Panel with Direct LDL reflex; Future  9. Vitamin D deficiency  Assessment & Plan:  Continue supplementation  Orders:  -     Vitamin D 25 hydroxy; Future  10. Migraine without aura and without status migrainosus, not intractable  Assessment & Plan:  Recently saw neurologist July 2024  Per their note: Continue Ubrelvy 100 mg as an abortive agent; triptan class is contraindicated secondary to history of intraparenchymal hemorrhage and she has previously trialed and failed Fioricet. Mrs. Larios does note benefit with use of Ubrelvy.   She is on Qulipta 60mg daily for preventative medication  Refills given today, however patient advised that I will be referring her to neurology who will take over management of these medications  Orders:  -     Ambulatory Referral to Neurology; Future  -     Ubrogepant (UBRELVY) 100 MG tablet; Take 1 tablet (100 mg total) by mouth if needed (migraine) can repeat dose in two hours if needed. Do not take more than two doses in 24 hour period.  -     Qulipta 60 MG TABS; Take 60 mg by mouth daily  11. Impaired fasting glucose  Assessment & Plan:  Last A1c 5.5, recommend healthy diet and exercise habits  Will get updated A1c  Orders:  -     Hemoglobin A1C; Future  12. Encounter for screening mammogram for malignant neoplasm of breast  -     Mammo screening bilateral w 3d & cad; Future  13. Need for hepatitis C screening test  -     Hepatitis C Antibody; Future      Depression Screening and Follow-up Plan: Patient's depression screening was positive with a PHQ-9 score of 6. Patient with underlying depression and was advised to continue current medications as  prescribed.       History of Present Illness     Patient is a 68-year-old female presenting to the office to establish care  She is a resident of [facility]  She has no acute concerns today  She notes she has been out of her Qulipta and Ubrelvy over the last several days.  She has noted increase in migraine frequency.  Previously has tolerated Qulipta very well, with decrease in migraine frequency when on the medication  She was seeing neurology at [facility], not a couple to obtain additional prescription for mood at this time as she recently moved to the area    History of CVA hemorrhagic.  Right basal ganglia IPH  August 2021  Residual left-sided facial droop, left UE and LE weakness  Recently titrated off of Depakote  Continues Keppra 500 mg twice daily for seizure prevention    Major depressive disorder  Currently on Zoloft 50 mg daily  Notes well-controlled    Last labs completed 10/28/2023- reviewed  Mammogram 11/1/2023  Last DXA unknown    History of DVT while in rehab following stroke  She has been seen by vascular surgery in the past for left leg swelling.  Per patient, workup noted venous insufficiency unable to be operated on  Leg swelling at baseline today, occasionally will wear compression stockings          Review of Systems   Constitutional:  Negative for chills, fatigue and fever.   HENT:  Negative for congestion, ear pain, postnasal drip, rhinorrhea, sinus pressure, sore throat and trouble swallowing.    Eyes:  Negative for pain and visual disturbance.   Respiratory:  Negative for cough, shortness of breath and wheezing.    Cardiovascular:  Negative for chest pain, palpitations and leg swelling.   Gastrointestinal:  Negative for abdominal pain, nausea and vomiting.   Genitourinary:  Negative for difficulty urinating, dysuria and hematuria.   Musculoskeletal:  Positive for arthralgias.   Neurological:  Negative for dizziness, weakness, light-headedness, numbness and  headaches.   Psychiatric/Behavioral:  Negative for dysphoric mood and sleep disturbance. The patient is not nervous/anxious.    All other systems reviewed and are negative.    Medical History Reviewed by provider this encounter:       Objective     /76 (BP Location: Right arm, Patient Position: Sitting, Cuff Size: Standard)   Pulse 74   Temp 98 °F (36.7 °C) (Temporal)   Ht 5' (1.524 m)   Wt 81.6 kg (180 lb)   LMP  (LMP Unknown)   SpO2 94%   BMI 35.15 kg/m²     Physical Exam  Vitals and nursing note reviewed.   Constitutional:       General: She is not in acute distress.     Appearance: Normal appearance. She is not ill-appearing.   HENT:      Head: Normocephalic and atraumatic.      Right Ear: Tympanic membrane, ear canal and external ear normal.      Left Ear: Tympanic membrane, ear canal and external ear normal.      Nose: Nose normal.      Mouth/Throat:      Mouth: Mucous membranes are moist.      Pharynx: Oropharynx is clear.   Eyes:      General: No scleral icterus.     Conjunctiva/sclera: Conjunctivae normal.      Pupils: Pupils are equal, round, and reactive to light.   Cardiovascular:      Rate and Rhythm: Normal rate and regular rhythm.      Heart sounds: No murmur heard.     No friction rub. No gallop.   Pulmonary:      Effort: Pulmonary effort is normal. No respiratory distress.      Breath sounds: Normal breath sounds. No wheezing, rhonchi or rales.   Chest:      Chest wall: No tenderness.   Musculoskeletal:      Right lower leg: No edema.      Left lower leg: Edema (trace) present.   Skin:     General: Skin is warm and dry.      Coloration: Skin is not pale.      Findings: No erythema, lesion or rash.   Neurological:      Mental Status: She is alert and oriented to person, place, and time. Mental status is at baseline.      Comments: Chronic left-sided facial droop  Decree strength in left upper and left lower extremity    Psychiatric:         Mood and Affect: Mood normal.         Behavior:  Behavior normal.       Administrative Statements

## 2024-08-12 NOTE — ASSESSMENT & PLAN NOTE
Residual left-sided facial droop, LUE > LLE weakness following CVA in 2021  Patient interested in physical therapy to improve hand strength, referral placed today

## 2024-08-12 NOTE — ASSESSMENT & PLAN NOTE
Recently saw neurologist July 2024  Per their note: Continue Ubrelvy 100 mg as an abortive agent; triptan class is contraindicated secondary to history of intraparenchymal hemorrhage and she has previously trialed and failed Fioricet. Mrs. Larios does note benefit with use of Ubrelvy.   She is on Qulipta 60mg daily for preventative medication  Refills given today, however patient advised that I will be referring her to neurology who will take over management of these medications

## 2024-08-12 NOTE — ASSESSMENT & PLAN NOTE
August 2021  Currently on aspirin 81 mg daily and simvastatin 40 mg daily  She is on Keppra for seizure prophylaxis.  500 mg twice daily per last neurology note  Noted strokelike symptoms noted  Will refer to neurology

## 2024-08-12 NOTE — TELEPHONE ENCOUNTER
Patient was calling to see if she could get her Ubrelvy sent to another pharmacy. She said it was too expensive at her local pharmacy. She wants it sent to:  [facility] - [place], [place] - [address]

## 2024-08-17 ENCOUNTER — APPOINTMENT (OUTPATIENT)
Dept: LAB | Facility: IMAGING CENTER | Age: 69
End: 2024-08-17
Payer: COMMERCIAL

## 2024-08-17 DIAGNOSIS — R73.01 IMPAIRED FASTING GLUCOSE: ICD-10-CM

## 2024-08-17 DIAGNOSIS — I10 ESSENTIAL HYPERTENSION: ICD-10-CM

## 2024-08-17 DIAGNOSIS — Z11.59 NEED FOR HEPATITIS C SCREENING TEST: ICD-10-CM

## 2024-08-17 DIAGNOSIS — E55.9 VITAMIN D DEFICIENCY: ICD-10-CM

## 2024-08-17 DIAGNOSIS — E78.2 MIXED HYPERLIPIDEMIA: ICD-10-CM

## 2024-08-17 LAB
25(OH)D3 SERPL-MCNC: 38.4 NG/ML (ref 30–100)
ALBUMIN SERPL BCG-MCNC: 4.3 G/DL (ref 3.5–5)
ALP SERPL-CCNC: 59 U/L (ref 34–104)
ALT SERPL W P-5'-P-CCNC: 29 U/L (ref 7–52)
ANION GAP SERPL CALCULATED.3IONS-SCNC: 8 MMOL/L (ref 4–13)
AST SERPL W P-5'-P-CCNC: 19 U/L (ref 13–39)
BASOPHILS # BLD AUTO: 0.03 THOUSANDS/ÂΜL (ref 0–0.1)
BASOPHILS NFR BLD AUTO: 0 % (ref 0–1)
BILIRUB SERPL-MCNC: 0.57 MG/DL (ref 0.2–1)
BUN SERPL-MCNC: 11 MG/DL (ref 5–25)
CALCIUM SERPL-MCNC: 9.5 MG/DL (ref 8.4–10.2)
CHLORIDE SERPL-SCNC: 102 MMOL/L (ref 96–108)
CHOLEST SERPL-MCNC: 281 MG/DL
CO2 SERPL-SCNC: 30 MMOL/L (ref 21–32)
CREAT SERPL-MCNC: 0.6 MG/DL (ref 0.6–1.3)
EOSINOPHIL # BLD AUTO: 0.01 THOUSAND/ÂΜL (ref 0–0.61)
EOSINOPHIL NFR BLD AUTO: 0 % (ref 0–6)
ERYTHROCYTE [DISTWIDTH] IN BLOOD BY AUTOMATED COUNT: 13.1 % (ref 11.6–15.1)
EST. AVERAGE GLUCOSE BLD GHB EST-MCNC: 114 MG/DL
GFR SERPL CREATININE-BSD FRML MDRD: 93 ML/MIN/1.73SQ M
GLUCOSE P FAST SERPL-MCNC: 108 MG/DL (ref 65–99)
HBA1C MFR BLD: 5.6 %
HCT VFR BLD AUTO: 46.8 % (ref 34.8–46.1)
HCV AB SER QL: NORMAL
HDLC SERPL-MCNC: 45 MG/DL
HGB BLD-MCNC: 15.5 G/DL (ref 11.5–15.4)
IMM GRANULOCYTES # BLD AUTO: 0.02 THOUSAND/UL (ref 0–0.2)
IMM GRANULOCYTES NFR BLD AUTO: 0 % (ref 0–2)
LDLC SERPL CALC-MCNC: 197 MG/DL (ref 0–100)
LYMPHOCYTES # BLD AUTO: 3.63 THOUSANDS/ÂΜL (ref 0.6–4.47)
LYMPHOCYTES NFR BLD AUTO: 45 % (ref 14–44)
MCH RBC QN AUTO: 30.9 PG (ref 26.8–34.3)
MCHC RBC AUTO-ENTMCNC: 33.1 G/DL (ref 31.4–37.4)
MCV RBC AUTO: 93 FL (ref 82–98)
MONOCYTES # BLD AUTO: 0.53 THOUSAND/ÂΜL (ref 0.17–1.22)
MONOCYTES NFR BLD AUTO: 7 % (ref 4–12)
NEUTROPHILS # BLD AUTO: 3.86 THOUSANDS/ÂΜL (ref 1.85–7.62)
NEUTS SEG NFR BLD AUTO: 48 % (ref 43–75)
NRBC BLD AUTO-RTO: 0 /100 WBCS
PLATELET # BLD AUTO: 289 THOUSANDS/UL (ref 149–390)
PMV BLD AUTO: 10.2 FL (ref 8.9–12.7)
POTASSIUM SERPL-SCNC: 4.2 MMOL/L (ref 3.5–5.3)
PROT SERPL-MCNC: 7 G/DL (ref 6.4–8.4)
RBC # BLD AUTO: 5.02 MILLION/UL (ref 3.81–5.12)
SODIUM SERPL-SCNC: 140 MMOL/L (ref 135–147)
TRIGL SERPL-MCNC: 194 MG/DL
TSH SERPL DL<=0.05 MIU/L-ACNC: 2.57 UIU/ML (ref 0.45–4.5)
WBC # BLD AUTO: 8.08 THOUSAND/UL (ref 4.31–10.16)

## 2024-08-17 PROCEDURE — 84443 ASSAY THYROID STIM HORMONE: CPT

## 2024-08-17 PROCEDURE — 83036 HEMOGLOBIN GLYCOSYLATED A1C: CPT

## 2024-08-17 PROCEDURE — 80061 LIPID PANEL: CPT

## 2024-08-17 PROCEDURE — 85025 COMPLETE CBC W/AUTO DIFF WBC: CPT

## 2024-08-17 PROCEDURE — 82306 VITAMIN D 25 HYDROXY: CPT

## 2024-08-17 PROCEDURE — 80053 COMPREHEN METABOLIC PANEL: CPT

## 2024-08-17 PROCEDURE — 36415 COLL VENOUS BLD VENIPUNCTURE: CPT

## 2024-08-17 PROCEDURE — 86803 HEPATITIS C AB TEST: CPT

## 2024-08-20 ENCOUNTER — TELEPHONE (OUTPATIENT)
Age: 69
End: 2024-08-20

## 2024-08-20 DIAGNOSIS — E78.2 MIXED HYPERLIPIDEMIA: Primary | ICD-10-CM

## 2024-08-20 RX ORDER — ATORVASTATIN CALCIUM 40 MG/1
40 TABLET, FILM COATED ORAL DAILY
Qty: 100 TABLET | Refills: 1 | Status: SHIPPED | OUTPATIENT
Start: 2024-08-20

## 2024-08-20 NOTE — TELEPHONE ENCOUNTER
Patient returned call aware of results and recommendations. Pt inquiring if able to start on higher dose of atorvastatin prior to OV.    Please review and advise.  Thank you

## 2024-09-06 DIAGNOSIS — E03.9 HYPOTHYROIDISM, UNSPECIFIED TYPE: ICD-10-CM

## 2024-09-06 DIAGNOSIS — F32.1 CURRENT MODERATE EPISODE OF MAJOR DEPRESSIVE DISORDER WITHOUT PRIOR EPISODE (HCC): Primary | ICD-10-CM

## 2024-09-06 RX ORDER — LEVOTHYROXINE SODIUM 75 MCG
75 TABLET ORAL DAILY
Qty: 90 TABLET | Refills: 1 | Status: SHIPPED | OUTPATIENT
Start: 2024-09-06

## 2024-09-06 NOTE — TELEPHONE ENCOUNTER
Reason for call:   [x] Refill   [] Prior Auth  [] Other:     Office:   [x] PCP/Provider - [facility] - Julia Lau DO   [] Specialty/Provider -     Medication:  sertraline (ZOLOFT) 50 mg tablet    Dose/Frequency: Take 50 mg by mouth daily    Quantity: not listed    Medication:  SYNTHROID 75 MCG tablet    Dose/Frequency: Take 1 tablet (75 mcg total) by mouth daily     Quantity: 90 tablet     Pharmacy: SYNTHROID 75 MCG tablet     Does the patient have enough for 3 days?   [] Yes   [x] No - Send as HP to POD

## 2024-09-09 ENCOUNTER — TELEPHONE (OUTPATIENT)
Dept: NEUROLOGY | Facility: CLINIC | Age: 69
End: 2024-09-09

## 2024-09-09 ENCOUNTER — TELEPHONE (OUTPATIENT)
Age: 69
End: 2024-09-09

## 2024-09-09 NOTE — TELEPHONE ENCOUNTER
Patient called to see if she could get transportation for visit 9/10/2024.  Please assist if possible.

## 2024-09-09 NOTE — TELEPHONE ENCOUNTER
ALEXIA called [employer] through [employer] at [phone].  Spoke with Marcelina.  Patient does have transportation benefits with [employer].  Patient does not require assistance to get into vehicle by w/c.  No person will be traveling with her.  Patient will be picked up at 9:45am,  can arrive 15 min before or after.   12:15pm after apt to return home.  Addresses confirmed and provided.  Marcelina provided both rides confirmation #[id number].       ALEXIA called patient at [phone].  Patient utilizes a walker, no O2.  Will not have someone accompany her.  Provided above details.  Patient said she thought her insurance provided transportation but was not sure how to schedule.  ALEXIA provided patient with information and contact number to schedule for the future.  Patient was appreciative of the help.  ALEXIA remains available.

## 2024-09-09 NOTE — TELEPHONE ENCOUNTER
9/9/24 Lvm to conf appt with  on   9/10/24          .Please conf appt if pt calls back.Thank you in advance

## 2024-09-10 ENCOUNTER — OFFICE VISIT (OUTPATIENT)
Dept: NEUROLOGY | Facility: CLINIC | Age: 69
End: 2024-09-10
Payer: COMMERCIAL

## 2024-09-10 VITALS
SYSTOLIC BLOOD PRESSURE: 126 MMHG | DIASTOLIC BLOOD PRESSURE: 82 MMHG | HEIGHT: 60 IN | WEIGHT: 179.2 LBS | TEMPERATURE: 97.9 F | OXYGEN SATURATION: 98 % | HEART RATE: 82 BPM | BODY MASS INDEX: 35.18 KG/M2

## 2024-09-10 DIAGNOSIS — I69.30 HISTORY OF HEMORRHAGIC CEREBROVASCULAR ACCIDENT (CVA) WITH RESIDUAL DEFICIT: ICD-10-CM

## 2024-09-10 DIAGNOSIS — G43.009 MIGRAINE WITHOUT AURA AND WITHOUT STATUS MIGRAINOSUS, NOT INTRACTABLE: ICD-10-CM

## 2024-09-10 PROCEDURE — 99205 OFFICE O/P NEW HI 60 MIN: CPT | Performed by: PSYCHIATRY & NEUROLOGY

## 2024-09-10 RX ORDER — PROPRANOLOL HYDROCHLORIDE 40 MG/1
60 TABLET ORAL 2 TIMES DAILY
Qty: 60 TABLET | Refills: 2 | Status: SHIPPED | OUTPATIENT
Start: 2024-09-10 | End: 2024-09-10 | Stop reason: SDUPTHER

## 2024-09-10 RX ORDER — PROPRANOLOL HYDROCHLORIDE 60 MG/1
60 TABLET ORAL 2 TIMES DAILY
Qty: 60 TABLET | Refills: 3 | Status: SHIPPED | OUTPATIENT
Start: 2024-09-10

## 2024-09-10 NOTE — ASSESSMENT & PLAN NOTE
Mri brain from 2019 showed R BG hemorrhage which is likely hypertensive  It was from August 2019  Related to high BP  Patient is on aspirin, and atorvastatin  Is still doing PT, and OT 2-3 times a week    Orders:    Ambulatory Referral to Neurology

## 2024-09-10 NOTE — ASSESSMENT & PLAN NOTE
"Current  treatment plan -   Preventative medications:  continue with quilpta, propanolol  Failed medications in the past - several in the past including botox injections     Abortive medications: continue with ubrelvy 100mg as needed     Botox/CGRPs medications: has tried in the past, and did not want to start anytime soon.     Acute treatment in the office - not at this time.     Lifestyle Modifications:  1.Maintain headache diary.  We discussed an GLENN for a smart phone is \"Migraine buddy\"  2. When patient has a moderate to severe headache, they should seek rest, initiate relaxation and apply cold compresses to the head.   3. Hydration - Please drink at least 64 ounces of water a day to help remain hydrated.  4. Sleep -  Maintain regular sleep schedule. Adults need at least 7-8 hours of uninterrupted a night. Maintain good sleep hygiene:Adults need at least 7-8 hours of uninterrupted a night.   5. Diet - Patient is to have regular frequent meals to prevent headache onset. Avoid dietary trigger. (aged cheese, peanuts, MSG, aspartame and nitrates).  6. Medications - Limit over the counter medications such as Tylenol, Ibuprofen, Aleve, Excedrin. (No more than 3 times a week).  7. Exercise - Daily exercise is not only good for your heart but also good for your mental health. Recommend 4-5 times a week for 20 minutes.        Orders:    Ambulatory Referral to Neurology    Ubrogepant (UBRELVY) 100 MG tablet; Take 1 tablet (100 mg total) by mouth if needed (migraine) can repeat dose in two hours if needed. Do not take more than two doses in 24 hour period.    propranolol (INDERAL) 60 mg tablet; Take 1 tablet (60 mg total) by mouth 2 (two) times a day    "

## 2024-09-10 NOTE — TELEPHONE ENCOUNTER
Patient's apt was successful.  No further questions or needs at this time.  Patient knows how to schedule rides for herself through her insurance via [address] for the future.  SW will be available for future social needs as requested. Patient has contact information should further needs arise.  Will close task at this time.

## 2024-09-10 NOTE — PROGRESS NOTES
"  Name: Quynh Larios      : 1955      MRN: [id number]  Encounter Provider: Fabiola Mann MD  Encounter Date: 9/10/2024   Encounter department: [facility] [place]    Assessment & Plan  History of hemorrhagic cerebrovascular accident (CVA) with residual deficit  Mri brain from 2019 showed R BG hemorrhage which is likely hypertensive  It was from 2019  Related to high BP  Patient is on aspirin, and atorvastatin  Is still doing PT, and OT 2-3 times a week    Orders:    Ambulatory Referral to Neurology    Migraine without aura and without status migrainosus, not intractable  Current  treatment plan -   Preventative medications:  continue with quilpta, propanolol  Failed medications in the past - several in the past including botox injections     Abortive medications: continue with ubrelvy 100mg as needed     Botox/CGRPs medications: has tried in the past, and did not want to start anytime soon.     Acute treatment in the office - not at this time.     Lifestyle Modifications:  1.Maintain headache diary.  We discussed an GLENN for a smart phone is \"Migraine buddy\"  2. When patient has a moderate to severe headache, they should seek rest, initiate relaxation and apply cold compresses to the head.   3. Hydration - Please drink at least 64 ounces of water a day to help remain hydrated.  4. Sleep -  Maintain regular sleep schedule. Adults need at least 7-8 hours of uninterrupted a night. Maintain good sleep hygiene:Adults need at least 7-8 hours of uninterrupted a night.   5. Diet - Patient is to have regular frequent meals to prevent headache onset. Avoid dietary trigger. (aged cheese, peanuts, MSG, aspartame and nitrates).  6. Medications - Limit over the counter medications such as Tylenol, Ibuprofen, Aleve, Excedrin. (No more than 3 times a week).  7. Exercise - Daily exercise is not only good for your heart but also good for your mental health. Recommend 4-5 times a week for 20 " minutes.        Orders:    Ambulatory Referral to Neurology    Ubrogepant (UBRELVY) 100 MG tablet; Take 1 tablet (100 mg total) by mouth if needed (migraine) can repeat dose in two hours if needed. Do not take more than two doses in 24 hour period.    propranolol (INDERAL) 60 mg tablet; Take 1 tablet (60 mg total) by mouth 2 (two) times a day      History of Present Illness   68-year-old female who is here as a new patient to establish care with us.    Patient is here with headaches daily, tension type and migraines.  They started age 29.  They are about twice a week she is having about 10-12 headaches a month.  They last 1 to 3 days.  On a scale of 1-10 headaches are about 8 out of 10.  They are located bifrontally by occipitally.  Description of her pain is throbbing/pressure-like.  Associated symptoms have been headaches are not nausea insomnia constipation problems prefers quiet/dark room.  Sensitive to light sound and odor.  Triggers for headache are stress, sunlight fatigue and other changes.  About    HPI  Review of Systems   Constitutional:  Positive for fatigue (often).   HENT: Negative.     Eyes:  Positive for photophobia (phonophobia, sensitivity to smells) and visual disturbance (lights).   Respiratory: Negative.     Cardiovascular: Negative.    Gastrointestinal:  Positive for nausea.   Endocrine: Positive for cold intolerance and heat intolerance.   Genitourinary: Negative.    Musculoskeletal: Negative.    Skin: Negative.    Allergic/Immunologic: Negative.    Neurological:  Positive for numbness (fingers, L hand) and headaches (1-w, 9/10, preassure like senstion located in the back of the eyes and neck).   Hematological: Negative.    Psychiatric/Behavioral: Negative.     All other systems reviewed and are negative.    I have personally reviewed the MA's review of systems and made changes as necessary.      Objective     /82   Pulse 82   Temp 97.9 °F (36.6 °C) (Temporal)   Ht 5' (1.524 m)   Wt  81.3 kg (179 lb 3.2 oz)   LMP  (LMP Unknown)   SpO2 98%   BMI 35.00 kg/m²     Physical Exam    General - patient is alert   Speech - no dysarthria noted, no aphasia noted.     Neuro:   Cranial nerves: PERRL, EOMI, facial sensation intact to soft touch in V1, V2 and V3, L facial asymmetry noted, uvula/palate midline, tongue midline.   Motor: L sided weakness noted  Sensory - intact to soft touch throughout  Reflexes - 2+ throughout  Coordination - no ataxia/dysmetria noted  Gait - ambulates with a walker.   Neurologic Exam    Results/Data:  I have reviewed the results and images from the 40 in detail with the patient.    Administrative Statements   I have spent a total time of 55 minutes in caring for this patient on the day of the visit/encounter including Risks and benefits of tx options, Instructions for management, Counseling / Coordination of care, Documenting in the medical record, Reviewing / ordering tests, medicine, procedures  , Obtaining or reviewing history  , and Communicating with other healthcare professionals .

## 2024-09-11 ENCOUNTER — RA CDI HCC (OUTPATIENT)
Dept: OTHER | Facility: HOSPITAL | Age: 69
End: 2024-09-11

## 2024-09-12 ENCOUNTER — HOSPITAL ENCOUNTER (OUTPATIENT)
Dept: RADIOLOGY | Facility: IMAGING CENTER | Age: 69
End: 2024-09-12
Payer: COMMERCIAL

## 2024-09-12 DIAGNOSIS — M81.0 AGE-RELATED OSTEOPOROSIS WITHOUT CURRENT PATHOLOGICAL FRACTURE: ICD-10-CM

## 2024-09-12 PROCEDURE — 77080 DXA BONE DENSITY AXIAL: CPT

## 2024-09-18 ENCOUNTER — OFFICE VISIT (OUTPATIENT)
Dept: INTERNAL MEDICINE CLINIC | Facility: OTHER | Age: 69
End: 2024-09-18
Payer: COMMERCIAL

## 2024-09-18 VITALS
WEIGHT: 178 LBS | HEIGHT: 60 IN | BODY MASS INDEX: 34.95 KG/M2 | DIASTOLIC BLOOD PRESSURE: 76 MMHG | HEART RATE: 63 BPM | OXYGEN SATURATION: 97 % | TEMPERATURE: 97.7 F | SYSTOLIC BLOOD PRESSURE: 122 MMHG

## 2024-09-18 DIAGNOSIS — I69.30 HISTORY OF HEMORRHAGIC CEREBROVASCULAR ACCIDENT (CVA) WITH RESIDUAL DEFICIT: ICD-10-CM

## 2024-09-18 DIAGNOSIS — E55.9 VITAMIN D DEFICIENCY: ICD-10-CM

## 2024-09-18 DIAGNOSIS — G43.009 MIGRAINE WITHOUT AURA AND WITHOUT STATUS MIGRAINOSUS, NOT INTRACTABLE: ICD-10-CM

## 2024-09-18 DIAGNOSIS — R73.01 IMPAIRED FASTING GLUCOSE: ICD-10-CM

## 2024-09-18 DIAGNOSIS — M81.0 AGE-RELATED OSTEOPOROSIS WITHOUT CURRENT PATHOLOGICAL FRACTURE: ICD-10-CM

## 2024-09-18 DIAGNOSIS — F32.1 CURRENT MODERATE EPISODE OF MAJOR DEPRESSIVE DISORDER WITHOUT PRIOR EPISODE (HCC): ICD-10-CM

## 2024-09-18 DIAGNOSIS — Z00.00 ENCOUNTER FOR ANNUAL WELLNESS VISIT (AWV) IN MEDICARE PATIENT: Primary | ICD-10-CM

## 2024-09-18 DIAGNOSIS — E78.2 MIXED HYPERLIPIDEMIA: ICD-10-CM

## 2024-09-18 DIAGNOSIS — E03.9 HYPOTHYROIDISM, UNSPECIFIED TYPE: ICD-10-CM

## 2024-09-18 DIAGNOSIS — I69.154 HEMIPARESIS OF LEFT NONDOMINANT SIDE AS LATE EFFECT OF NONTRAUMATIC INTRACEREBRAL HEMORRHAGE (HCC): ICD-10-CM

## 2024-09-18 DIAGNOSIS — Z87.42 HISTORY OF ABNORMAL CERVICAL PAP SMEAR: ICD-10-CM

## 2024-09-18 DIAGNOSIS — I10 ESSENTIAL HYPERTENSION: ICD-10-CM

## 2024-09-18 PROCEDURE — G0439 PPPS, SUBSEQ VISIT: HCPCS

## 2024-09-18 PROCEDURE — 99214 OFFICE O/P EST MOD 30 MIN: CPT

## 2024-09-18 RX ORDER — LEVOTHYROXINE SODIUM 75 MCG
75 TABLET ORAL DAILY
Qty: 90 TABLET | Refills: 1 | Status: SHIPPED | OUTPATIENT
Start: 2024-09-18

## 2024-09-18 RX ORDER — VIBEGRON 75 MG/1
75 TABLET, FILM COATED ORAL DAILY
COMMUNITY
Start: 2024-07-02

## 2024-09-18 RX ORDER — SERTRALINE HYDROCHLORIDE 25 MG/1
25 TABLET, FILM COATED ORAL DAILY
Qty: 90 TABLET | Refills: 1 | Status: SHIPPED | OUTPATIENT
Start: 2024-09-18 | End: 2025-09-13

## 2024-09-18 NOTE — PROGRESS NOTES
Ambulatory Visit  Name: Quynh Larios      : 1955      MRN: [id number]  Encounter Provider: Humaira Allen PA-C  Encounter Date: 2024   Encounter department: [facility] [place]    Assessment & Plan       Preventive health issues were discussed with patient, and age appropriate screening tests were ordered as noted in patient's After Visit Summary. Personalized health advice and appropriate referrals for health education or preventive services given if needed, as noted in patient's After Visit Summary.    History of Present Illness     HPI   Patient Care Team:  Humaira Allen PA-C as PCP - General (Internal Medicine)  Siddhartha Sharma MD    Review of Systems  Medical History Reviewed by provider this encounter:       Annual Wellness Visit Questionnaire       PREVENTIVE SCREENINGS      Cardiovascular Screening:    General: Screening Not Indicated and History Lipid Disorder      Diabetes Screening:     General: Screening Current      Breast Cancer Screening:     General: Screening Current      Cervical Cancer Screening:    General: History Cervical Cancer      Osteoporosis Screening:    General: Screening Not Indicated and History Osteoporosis      Lung Cancer Screening:     General: Screening Not Indicated      Hepatitis C Screening:    General: Screening Current    SDOH Risk Assessment  Social determinants of health (SDOH) risk assesment tool was completed. The tool at a minimum covered housing stability, food insecurity, transportation needs, and utility difficulty. Patient had at risk responses for the following SDOH domains: transportation needs and housing stability.     Social Determinants of Health     Financial Resource Strain: Low Risk  (2019)    Received from [facility]    Overall Financial Resource Strain (CARDIA)     Difficulty of Paying Living Expenses: Not hard at all   Food Insecurity: No Food Insecurity (2024)    Hunger Vital Sign     Worried About  Running Out of Food in the Last Year: Never true     Ran Out of Food in the Last Year: Never true   Transportation Needs: Unmet Transportation Needs (9/17/2024)    PRAPARE - Transportation     Lack of Transportation (Medical): Yes     Lack of Transportation (Non-Medical): Yes   Housing Stability: High Risk (9/17/2024)    Housing Stability Vital Sign     Unable to Pay for Housing in the Last Year: Patient declined     Number of Times Moved in the Last Year: 2     Homeless in the Last Year: No   Utilities: Not At Risk (9/17/2024)    [facility] Utilities     Threatened with loss of utilities: No     No results found.    Objective     LMP  (LMP Unknown)     Physical Exam

## 2024-09-18 NOTE — ASSESSMENT & PLAN NOTE
Last TSH WNL.  Continue Synthroid 75 mcg daily  Orders:    Synthroid 75 MCG tablet; Take 1 tablet (75 mcg total) by mouth daily    TSH, 3rd generation with Free T4 reflex; Future

## 2024-09-18 NOTE — PATIENT INSTRUCTIONS
- Increase sertraline to 75mg daily    Medicare Preventive Visit Patient Instructions  Thank you for completing your Welcome to Medicare Visit or Medicare Annual Wellness Visit today. Your next wellness visit will be due in one year (9/19/2025).  The screening/preventive services that you may require over the next 5-10 years are detailed below. Some tests may not apply to you based off risk factors and/or age. Screening tests ordered at today's visit but not completed yet may show as past due. Also, please note that scanned in results may not display below.  Preventive Screenings:  Service Recommendations Previous Testing/Comments   Colorectal Cancer Screening  * Colonoscopy    * Fecal Occult Blood Test (FOBT)/Fecal Immunochemical Test (FIT)  * Fecal DNA/Cologuard Test  * Flexible Sigmoidoscopy Age: 45-75 years old   Colonoscopy: every 10 years (may be performed more frequently if at higher risk)  OR  FOBT/FIT: every 1 year  OR  Cologuard: every 3 years  OR  Sigmoidoscopy: every 5 years  Screening may be recommended earlier than age 45 if at higher risk for colorectal cancer. Also, an individualized decision between you and your healthcare provider will decide whether screening between the ages of 76-85 would be appropriate. Colonoscopy: 08/14/2009  FOBT/FIT: Not on file  Cologuard: Not on file  Sigmoidoscopy: Not on file          Breast Cancer Screening Age: 40+ years old  Frequency: every 1-2 years  Not required if history of left and right mastectomy Mammogram: 11/01/2023    Screening Current  Screening Current   Cervical Cancer Screening Between the ages of 21-29, pap smear recommended once every 3 years.   Between the ages of 30-65, can perform pap smear with HPV co-testing every 5 years.   Recommendations may differ for women with a history of total hysterectomy, cervical cancer, or abnormal pap smears in past. Pap Smear: 11/08/2018    History Cervical Cancer  History Cervical Cancer   Hepatitis C Screening  Pt sent by Dr Ela Olivo for SOB and Oxygen 82% RA, pt was given Alb/Atrovent tx by EMS 95% RA after tx. Once for adults born between 1945 and 1965  More frequently in patients at high risk for Hepatitis C Hep C Antibody: 08/17/2024    Screening Current  Screening Current   Diabetes Screening 1-2 times per year if you're at risk for diabetes or have pre-diabetes Fasting glucose: 108 mg/dL (8/17/2024)  A1C: 5.6 % (8/17/2024)  Screening Current  Screening Current   Cholesterol Screening Once every 5 years if you don't have a lipid disorder. May order more often based on risk factors. Lipid panel: 08/17/2024    Screening Not Indicated  History Lipid Disorder  Screening Not Indicated  History Lipid Disorder     Other Preventive Screenings Covered by Medicare:  Abdominal Aortic Aneurysm (AAA) Screening: covered once if your at risk. You're considered to be at risk if you have a family history of AAA.  Lung Cancer Screening: covers low dose CT scan once per year if you meet all of the following conditions: (1) Age 55-77; (2) No signs or symptoms of lung cancer; (3) Current smoker or have quit smoking within the last 15 years; (4) You have a tobacco smoking history of at least 20 pack years (packs per day multiplied by number of years you smoked); (5) You get a written order from a healthcare provider.  Glaucoma Screening: covered annually if you're considered high risk: (1) You have diabetes OR (2) Family history of glaucoma OR (3)  aged 50 and older OR (4)  American aged 65 and older  Osteoporosis Screening: covered every 2 years if you meet one of the following conditions: (1) You're estrogen deficient and at risk for osteoporosis based off medical history and other findings; (2) Have a vertebral abnormality; (3) On glucocorticoid therapy for more than 3 months; (4) Have primary hyperparathyroidism; (5) On osteoporosis medications and need to assess response to drug therapy.   Last bone density test (DXA Scan): 09/12/2024.  HIV Screening: covered annually if you're between the age of 15-65. Also  covered annually if you are younger than 15 and older than 65 with risk factors for HIV infection. For pregnant patients, it is covered up to 3 times per pregnancy.    Immunizations:  Immunization Recommendations   Influenza Vaccine Annual influenza vaccination during flu season is recommended for all persons aged >= 6 months who do not have contraindications   Pneumococcal Vaccine   * Pneumococcal conjugate vaccine = PCV13 (Prevnar 13), PCV15 (Vaxneuvance), PCV20 (Prevnar 20)  * Pneumococcal polysaccharide vaccine = PPSV23 (Pneumovax) Adults 19-63 yo with certain risk factors or if 65+ yo  If never received any pneumonia vaccine: recommend Prevnar 20 (PCV20)  Give PCV20 if previously received 1 dose of PCV13 or PPSV23   Hepatitis B Vaccine 3 dose series if at intermediate or high risk (ex: diabetes, end stage renal disease, liver disease)   Respiratory syncytial virus (RSV) Vaccine - COVERED BY MEDICARE PART D  * RSVPreF3 (Arexvy) CDC recommends that adults 60 years of age and older may receive a single dose of RSV vaccine using shared clinical decision-making (SCDM)   Tetanus (Td) Vaccine - COST NOT COVERED BY MEDICARE PART B Following completion of primary series, a booster dose should be given every 10 years to maintain immunity against tetanus. Td may also be given as tetanus wound prophylaxis.   Tdap Vaccine - COST NOT COVERED BY MEDICARE PART B Recommended at least once for all adults. For pregnant patients, recommended with each pregnancy.   Shingles Vaccine (Shingrix) - COST NOT COVERED BY MEDICARE PART B  2 shot series recommended in those 19 years and older who have or will have weakened immune systems or those 50 years and older     Health Maintenance Due:      Topic Date Due    Colorectal Cancer Screening  08/14/2019    Cervical Cancer Screening  11/08/2020    Breast Cancer Screening: Mammogram  11/01/2024    DXA SCAN  09/12/2027    Hepatitis C Screening  Completed     Immunizations Due:      Topic Date  Due    Influenza Vaccine (1) 09/01/2024     Advance Directives   What are advance directives?  Advance directives are legal documents that state your wishes and plans for medical care. These plans are made ahead of time in case you lose your ability to make decisions for yourself. Advance directives can apply to any medical decision, such as the treatments you want, and if you want to donate organs.   What are the types of advance directives?  There are many types of advance directives, and each state has rules about how to use them. You may choose a combination of any of the following:  Living will:  This is a written record of the treatment you want. You can also choose which treatments you do not want, which to limit, and which to stop at a certain time. This includes surgery, medicine, IV fluid, and tube feedings.   Durable power of  for healthcare (DPAHC):  This is a written record that states who you want to make healthcare choices for you when you are unable to make them for yourself. This person, called a proxy, is usually a family member or a friend. You may choose more than 1 proxy.  Do not resuscitate (DNR) order:  A DNR order is used in case your heart stops beating or you stop breathing. It is a request not to have certain forms of treatment, such as CPR. A DNR order may be included in other types of advance directives.  Medical directive:  This covers the care that you want if you are in a coma, near death, or unable to make decisions for yourself. You can list the treatments you want for each condition. Treatment may include pain medicine, surgery, blood transfusions, dialysis, IV or tube feedings, and a ventilator (breathing machine).  Values history:  This document has questions about your views, beliefs, and how you feel and think about life. This information can help others choose the care that you would choose.  Why are advance directives important?  An advance directive helps you control  your care. Although spoken wishes may be used, it is better to have your wishes written down. Spoken wishes can be misunderstood, or not followed. Treatments may be given even if you do not want them. An advance directive may make it easier for your family to make difficult choices about your care.   Fall Prevention    Fall prevention  includes ways to make your home and other areas safer. It also includes ways you can move more carefully to prevent a fall. Health conditions that cause changes in your blood pressure, vision, or muscle strength and coordination may increase your risk for falls. Medicines may also increase your risk for falls if they make you dizzy, weak, or sleepy.   Fall prevention tips:   Stand or sit up slowly.    Use assistive devices as directed.    Wear shoes that fit well and have soles that .    Wear a personal alarm.    Stay active.    Manage your medical conditions.    Home Safety Tips:  Add items to prevent falls in the bathroom.    Keep paths clear.    Install bright lights in your home.    Keep items you use often on shelves within reach.    Paint or place reflective tape on the edges of your stairs.    Urinary Incontinence   Urinary incontinence (UI)  is when you lose control of your bladder. UI develops because your bladder cannot store or empty urine properly. The 3 most common types of UI are stress incontinence, urge incontinence, or both.  Medicines:   May be given to help strengthen your bladder control. Report any side effects of medication to your healthcare provider.  Do pelvic muscle exercises often:  Your pelvic muscles help you stop urinating. Squeeze these muscles tight for 5 seconds, then relax for 5 seconds. Gradually work up to squeezing for 10 seconds. Do 3 sets of 15 repetitions a day, or as directed. This will help strengthen your pelvic muscles and improve bladder control.  Train your bladder:  Go to the bathroom at set times, such as every 2 hours, even if you  do not feel the urge to go. You can also try to hold your urine when you feel the urge to go. For example, hold your urine for 5 minutes when you feel the urge to go. As that becomes easier, hold your urine for 10 minutes.   Self-care:   Keep a UI record.  Write down how often you leak urine and how much you leak. Make a note of what you were doing when you leaked urine.  Drink liquids as directed. You may need to limit the amount of liquid you drink to help control your urine leakage. Do not drink any liquid right before you go to bed. Limit or do not have drinks that contain caffeine or alcohol.   Prevent constipation.  Eat a variety of high-fiber foods. Good examples are high-fiber cereals, beans, vegetables, and whole-grain breads. Walking is the best way to trigger your intestines to have a bowel movement.  Exercise regularly and maintain a healthy weight.  Weight loss and exercise will decrease pressure on your bladder and help you control your leakage.   Use a catheter as directed  to help empty your bladder. A catheter is a tiny, plastic tube that is put into your bladder to drain your urine.   Go to behavior therapy as directed.  Behavior therapy may be used to help you learn to control your urge to urinate.    Weight Management   Why it is important to manage your weight:  Being overweight increases your risk of health conditions such as heart disease, high blood pressure, type 2 diabetes, and certain types of cancer. It can also increase your risk for osteoarthritis, sleep apnea, and other respiratory problems. Aim for a slow, steady weight loss. Even a small amount of weight loss can lower your risk of health problems.  How to lose weight safely:  A safe and healthy way to lose weight is to eat fewer calories and get regular exercise. You can lose up about 1 pound a week by decreasing the number of calories you eat by 500 calories each day.   Healthy meal plan for weight management:  A healthy meal plan  includes a variety of foods, contains fewer calories, and helps you stay healthy. A healthy meal plan includes the following:  Eat whole-grain foods more often.  A healthy meal plan should contain fiber. Fiber is the part of grains, fruits, and vegetables that is not broken down by your body. Whole-grain foods are healthy and provide extra fiber in your diet. Some examples of whole-grain foods are whole-wheat breads and pastas, oatmeal, brown rice, and bulgur.  Eat a variety of vegetables every day.  Include dark, leafy greens such as spinach, kale, christian greens, and mustard greens. Eat yellow and orange vegetables such as carrots, sweet potatoes, and winter squash.   Eat a variety of fruits every day.  Choose fresh or canned fruit (canned in its own juice or light syrup) instead of juice. Fruit juice has very little or no fiber.  Eat low-fat dairy foods.  Drink fat-free (skim) milk or 1% milk. Eat fat-free yogurt and low-fat cottage cheese. Try low-fat cheeses such as mozzarella and other reduced-fat cheeses.  Choose meat and other protein foods that are low in fat.  Choose beans or other legumes such as split peas or lentils. Choose fish, skinless poultry (chicken or turkey), or lean cuts of red meat (beef or pork). Before you cook meat or poultry, cut off any visible fat.   Use less fat and oil.  Try baking foods instead of frying them. Add less fat, such as margarine, sour cream, regular salad dressing and mayonnaise to foods. Eat fewer high-fat foods. Some examples of high-fat foods include french fries, doughnuts, ice cream, and cakes.  Eat fewer sweets.  Limit foods and drinks that are high in sugar. This includes candy, cookies, regular soda, and sweetened drinks.  Exercise:  Exercise at least 30 minutes per day on most days of the week. Some examples of exercise include walking, biking, dancing, and swimming. You can also fit in more physical activity by taking the stairs instead of the elevator or  parking farther away from stores. Ask your healthcare provider about the best exercise plan for you.      © Copyright [employer] 2018 Information is for End User's use only and may not be sold, redistributed or otherwise used for commercial purposes. All illustrations and images included in CareNotes® are the copyrighted property of [employer] or [email or web address]

## 2024-09-18 NOTE — PROGRESS NOTES
Ambulatory Visit  Name: Quynh Larios      : 1955      MRN: [id number]  Encounter Provider: Humaira Allen PA-C  Encounter Date: 2024   Encounter department: [facility] [place]    Assessment & Plan  Current moderate episode of major depressive disorder without prior episode (HCC)  Patient noting fair control with Zoloft, interested in increase.  Will increase to 75 mg daily  Discussed referral to talk therapy to which patient declined   Patient advised to follow-up in office if depression continues to worsen or if dose increased has no improvement  Depression Screening Follow-up Plan: Patient's depression screening was positive with a PHQ-9 score of 8. Patient with underlying depression and was advised to continue current medications as prescribed.    Orders:    sertraline (ZOLOFT) 50 mg tablet; Take 1 tablet (50 mg total) by mouth daily    sertraline (ZOLOFT) 25 mg tablet; Take 1 tablet (25 mg total) by mouth daily    Hypothyroidism, unspecified type  Last TSH WNL.  Continue Synthroid 75 mcg daily  Orders:    Synthroid 75 MCG tablet; Take 1 tablet (75 mcg total) by mouth daily    TSH, 3rd generation with Free T4 reflex; Future    History of abnormal cervical Pap smear  Refer to OB/GYN  Orders:    Ambulatory Referral to Obstetrics / Gynecology; Future    Mixed hyperlipidemia  Lipid panel: Total cholesterol 281, triglycerides 194, HDL 45, , patient was switched to atorvastatin 40 mg daily from simvastatin  Repeat lipid panel 4 to 6 months  Recommend healthy diet and exercise habits    Orders:    Lipid Panel with Direct LDL reflex; Future    Comprehensive metabolic panel; Future    CBC and differential; Future    Essential hypertension  Controlled with amlodipine 5 mg daily and propranolol 60 mg twice daily  Continue low-salt diet, monitoring blood pressure at home         Migraine without aura and without status migrainosus, not intractable  Follows with  neurology  Current  treatment plan -   Preventative medications:  continue with quilpta, propanolol  Failed medications in the past - several in the past including botox injections   Abortive medications: continue with ubrelvy 100mg as needed          Impaired fasting glucose  A1c 5.6.  Continue healthy diet and exercise habits  Will continue to monitor A1c         Hemiparesis of left nondominant side as late effect of nontraumatic intracerebral hemorrhage (HCC)  Residual left-sided facial droop, LUE > LLE weakness following CVA in 2021  Continue PT/OT to improve hand strength and balance         Age-related osteoporosis without current pathological fracture  Continue vitamin D and calcium supplementation  Continue Fosamax 70 mg weekly  Continue weightbearing exercise  Repeat DEXA scan.  Patient with mild osteoporosis in the left femoral neck which appears overall unchanged from previous  We will continue current regimen as above.  Discussed with Dr. Tariq         History of hemorrhagic cerebrovascular accident (CVA) with residual deficit  Currently on aspirin 81 mg daily and atorvastatin 40 mg daily  Follows with neurology         Vitamin D deficiency  Vitamin D 38.4  Continue supplementation         Encounter for annual wellness visit (AWV) in Medicare patient           Depression Screening and Follow-up Plan: Patient's depression screening was positive with a PHQ-9 score of 8. Patient with underlying depression and was advised to continue current medications as prescribed. Increase Zoloft to 75 mg daily    Urinary Incontinence Plan of Care: counseling topics discussed: use restroom every 2 hours, limit alcohol, caffeine, spicy foods, and acidic foods and limiting fluid intake 3-4 hours before bed. Patient currently on Gemtesa for overactive bladder.       Preventive health issues were discussed with patient, and age appropriate screening tests were ordered as noted in patient's After Visit Summary. Personalized  health advice and appropriate referrals for health education or preventive services given if needed, as noted in patient's After Visit Summary.    History of Present Illness     Patient is a 68-year-old female presenting to the office for annual wellness visit and follow-up  Labs reviewed with patient  TSH, CMP, CBC overall unremarkable  Vitamin D 38.4  A1c 5.6  Lipid panel: Total cholesterol 281, triglycerides 194, HDL 45, , patient was switched to atorvastatin 40 mg daily from simvastatin    She recently saw neurology for history of CVA and migraines  Current provide amount of medications include Qulipta, propranolol.  Propranolol was increased at last visit  Abortive medications: Ubrelvy 100 mg as needed    Depression  Patient notes continued depression, is currently in the midst of a divorce and recently moved to [facility]  She notes fair control with Zoloft 50 mg daily, interested in dose increase  She notes that she has support through her sister and her son       Patient Care Team:  Humaira Allen PA-C as PCP - General (Internal Medicine)  Siddhartha Sharma MD    Review of Systems   Constitutional:  Negative for chills, fatigue and fever.   HENT:  Negative for congestion, ear pain, postnasal drip, rhinorrhea, sinus pressure, sore throat and trouble swallowing.    Eyes:  Negative for pain and visual disturbance.   Respiratory:  Negative for cough, shortness of breath and wheezing.    Cardiovascular:  Negative for chest pain, palpitations and leg swelling.   Gastrointestinal:  Negative for abdominal pain, constipation, diarrhea, nausea and vomiting.   Genitourinary:  Negative for difficulty urinating, dysuria and hematuria.   Musculoskeletal:  Negative for arthralgias and back pain.   Neurological:  Positive for headaches. Negative for dizziness, weakness, light-headedness and numbness.   Psychiatric/Behavioral:  Negative for dysphoric mood and sleep disturbance. The patient is not  nervous/anxious.    All other systems reviewed and are negative.    Medical History Reviewed by provider this encounter:  Tobacco  Allergies  Meds  Problems  Med Hx  Surg Hx  Fam Hx       Annual Wellness Visit Questionnaire   Quynh is here for her Subsequent Wellness visit.     Health Risk Assessment:   Patient rates overall health as fair. Patient feels that their physical health rating is same. Patient is dissatisfied with their life. Eyesight was rated as slightly worse. Hearing was rated as same. Patient feels that their emotional and mental health rating is slightly worse. Patients states they are never, rarely angry. Patient states they are sometimes unusually tired/fatigued. Pain experienced in the last 7 days has been some. Patient's pain rating has been 7/10. Patient states that she has experienced weight loss or gain in last 6 months.     Depression Screening:   PHQ-9 Score: 8      Fall Risk Screening:   In the past year, patient has experienced: history of falling in past year    Number of falls: 1  Injured during fall?: No    Feels unsteady when standing or walking?: Yes    Worried about falling?: Yes      Urinary Incontinence Screening:   Patient has leaked urine accidently in the last six months. Currently on Gemtesa for overactive bladder    Home Safety:  Patient does not have trouble with stairs inside or outside of their home. Patient has working smoke alarms and has working carbon monoxide detector. Home safety hazards include: none.     Nutrition:   Current diet is Limited junk food and Regular.     Medications:   Patient is currently taking over-the-counter supplements. OTC medications include: see medication list. Patient is able to manage medications.     Activities of Daily Living (ADLs)/Instrumental Activities of Daily Living (IADLs):   Walk and transfer into and out of bed and chair?: Yes  Dress and groom yourself?: Yes    Bathe or shower yourself?: Yes    Feed yourself? Yes  Do your  laundry/housekeeping?: Yes  Manage your money, pay your bills and track your expenses?: Yes  Make your own meals?: No    Do your own shopping?: Yes    Previous Hospitalizations:   Any hospitalizations or ED visits within the last 12 months?: No      Advance Care Planning:   Living will: Yes    Durable POA for healthcare: Yes    Advanced directive: Yes      Comments: Patient has living will and healthcare power of  documents at home.  She is advised to bring them to office so we can scan them in her chart.  Son is her first-line health care agent    Cognitive Screening:   Provider or family/friend/caregiver concerned regarding cognition?: No    PREVENTIVE SCREENINGS      Cardiovascular Screening:    General: History Lipid Disorder and Screening Current      Diabetes Screening:     General: Screening Current      Colorectal Cancer Screening:     General: Risks and Benefits Discussed and Patient Declines      Breast Cancer Screening:     General: Screening Current      Cervical Cancer Screening:    General: History Cervical Cancer and Risks and Benefits Discussed    Due for: Cervical Pap Smear      Osteoporosis Screening:    General: History Osteoporosis and Screening Current      Abdominal Aortic Aneurysm (AAA) Screening:        General: Screening Not Indicated      Lung Cancer Screening:     General: Screening Not Indicated      Hepatitis C Screening:    General: Screening Current    Screening, Brief Intervention, and Referral to Treatment (SBIRT)    Screening  Typical number of drinks in a day: 0  Typical number of drinks in a week: 0  Interpretation: Low risk drinking behavior.    AUDIT-C Screenin) How often did you have a drink containing alcohol in the past year? monthly or less  2) How many drinks did you have on a typical day when you were drinking in the past year? 1 to 2  3) How often did you have 6 or more drinks on one occasion in the past year? never    AUDIT-C Score: 1  Interpretation: Score  0-2 (female): Negative screen for alcohol misuse    Single Item Drug Screening:  How often have you used an illegal drug (including marijuana) or a prescription medication for non-medical reasons in the past year? never    Single Item Drug Screen Score: 0  Interpretation: Negative screen for possible drug use disorder    Brief Intervention  Alcohol & drug use screenings were reviewed. No concerns regarding substance use disorder identified. Healthy alcohol use/limits discussed.     SDOH Risk Assessment  Social determinants of health (SDOH) risk assesment tool was completed. The tool at a minimum covered housing stability, food insecurity, transportation needs, and utility difficulty. Patient had at risk responses for the following SDOH domains: housing stability.     Other Counseling Topics:   Car/seat belt/driving safety, skin self-exam, sunscreen and calcium and vitamin D intake and regular weightbearing exercise.     Social Determinants of Health     Financial Resource Strain: Low Risk  (9/20/2019)    Received from [facility]    Overall Financial Resource Strain (CARDIA)     Difficulty of Paying Living Expenses: Not hard at all   Food Insecurity: No Food Insecurity (9/18/2024)    Hunger Vital Sign     Worried About Running Out of Food in the Last Year: Never true     Ran Out of Food in the Last Year: Never true   Transportation Needs: No Transportation Needs (9/18/2024)    PRAPARE - Transportation     Lack of Transportation (Medical): No     Lack of Transportation (Non-Medical): No   Recent Concern: Transportation Needs - Unmet Transportation Needs (9/17/2024)    PRAPARE - Transportation     Lack of Transportation (Medical): Yes     Lack of Transportation (Non-Medical): Yes   Housing Stability: High Risk (9/18/2024)    Housing Stability Vital Sign     Unable to Pay for Housing in the Last Year: No     Number of Times Moved in the Last Year: 2     Homeless in the Last Year: No   Utilities: Not At Risk (9/18/2024)     [facility] Utilities     Threatened with loss of utilities: No     No results found.    Objective     /76 (BP Location: Left arm, Patient Position: Sitting, Cuff Size: Standard)   Pulse 63   Temp 97.7 °F (36.5 °C) (Temporal)   Ht 5' (1.524 m)   Wt 80.7 kg (178 lb)   LMP  (LMP Unknown)   SpO2 97%   BMI 34.76 kg/m²     Physical Exam  Vitals and nursing note reviewed.   Constitutional:       General: She is not in acute distress.     Appearance: Normal appearance. She is not ill-appearing.   HENT:      Head: Normocephalic and atraumatic.      Right Ear: Tympanic membrane, ear canal and external ear normal.      Left Ear: Tympanic membrane, ear canal and external ear normal.      Nose: Nose normal.      Mouth/Throat:      Mouth: Mucous membranes are moist.      Pharynx: Oropharynx is clear.   Eyes:      General: No scleral icterus.     Conjunctiva/sclera: Conjunctivae normal.      Pupils: Pupils are equal, round, and reactive to light.   Cardiovascular:      Rate and Rhythm: Normal rate and regular rhythm.      Heart sounds: Normal heart sounds. No murmur heard.     No friction rub. No gallop.   Pulmonary:      Effort: Pulmonary effort is normal. No respiratory distress.      Breath sounds: Normal breath sounds. No wheezing, rhonchi or rales.   Chest:      Chest wall: No tenderness.   Musculoskeletal:      Right lower leg: No edema.      Left lower leg: No edema.   Skin:     General: Skin is warm and dry.      Coloration: Skin is not pale.      Findings: No erythema, lesion or rash.   Neurological:      Mental Status: She is alert and oriented to person, place, and time. Mental status is at baseline.      Comments: Chronic left-sided facial droop and left hand weakness   Psychiatric:         Mood and Affect: Mood normal.         Behavior: Behavior normal.       Administrative Statements

## 2024-09-18 NOTE — ASSESSMENT & PLAN NOTE
Lipid panel: Total cholesterol 281, triglycerides 194, HDL 45, , patient was switched to atorvastatin 40 mg daily from simvastatin  Repeat lipid panel 4 to 6 months  Recommend healthy diet and exercise habits    Orders:    Lipid Panel with Direct LDL reflex; Future    Comprehensive metabolic panel; Future    CBC and differential; Future

## 2024-09-18 NOTE — ASSESSMENT & PLAN NOTE
Residual left-sided facial droop, LUE > LLE weakness following CVA in 2021  Continue PT/OT to improve hand strength and balance

## 2024-09-18 NOTE — ASSESSMENT & PLAN NOTE
Follows with neurology  Current  treatment plan -   Preventative medications:  continue with quilpta, propanolol  Failed medications in the past - several in the past including botox injections   Abortive medications: continue with ubrelvy 100mg as needed

## 2024-09-18 NOTE — ASSESSMENT & PLAN NOTE
Controlled with amlodipine 5 mg daily and propranolol 60 mg twice daily  Continue low-salt diet, monitoring blood pressure at home

## 2024-09-18 NOTE — ASSESSMENT & PLAN NOTE
Patient noting fair control with Zoloft, interested in increase.  Will increase to 75 mg daily  Discussed referral to talk therapy to which patient declined   Patient advised to follow-up in office if depression continues to worsen or if dose increased has no improvement  Depression Screening Follow-up Plan: Patient's depression screening was positive with a PHQ-9 score of 8. Patient with underlying depression and was advised to continue current medications as prescribed.    Orders:    sertraline (ZOLOFT) 50 mg tablet; Take 1 tablet (50 mg total) by mouth daily    sertraline (ZOLOFT) 25 mg tablet; Take 1 tablet (25 mg total) by mouth daily

## 2024-09-18 NOTE — ASSESSMENT & PLAN NOTE
Continue vitamin D and calcium supplementation  Continue Fosamax 70 mg weekly  Continue weightbearing exercise  Repeat DEXA scan.  Patient with mild osteoporosis in the left femoral neck which appears overall unchanged from previous  We will continue current regimen as above.  Discussed with Dr. Tariq

## 2024-09-19 ENCOUNTER — TELEPHONE (OUTPATIENT)
Dept: ADMINISTRATIVE | Facility: OTHER | Age: 69
End: 2024-09-19

## 2024-09-19 NOTE — TELEPHONE ENCOUNTER
Upon review of the In Basket request we were able to locate, review, and update the patient chart as requested for Medicare AW.    Any additional questions or concerns should be emailed to the Practice Liaisons via the appropriate education email address, please do not reply via In Basket.    Thank you  Rayne Jacques MA   [facility]           Niacinamide Pregnancy And Lactation Text: These medications are considered safe during pregnancy.

## 2024-09-19 NOTE — TELEPHONE ENCOUNTER
----- Message from Judy GAITAN sent at 9/18/2024  9:27 AM EDT -----  Regarding: awv  09/18/24 9:28 AM    Hello, our patient Quynh Larios has had Medicare AWV completed/performed. Please assist in updating the patient chart by pulling the Care Everywhere (CE) document. The date of service is 10/2022.     Thank you,  Judy Benjamin MA  [facility] [place]

## 2024-09-26 ENCOUNTER — OFFICE VISIT (OUTPATIENT)
Dept: OBGYN CLINIC | Facility: CLINIC | Age: 69
End: 2024-09-26
Payer: COMMERCIAL

## 2024-09-26 VITALS — BODY MASS INDEX: 35.27 KG/M2 | SYSTOLIC BLOOD PRESSURE: 130 MMHG | DIASTOLIC BLOOD PRESSURE: 72 MMHG | WEIGHT: 180.6 LBS

## 2024-09-26 DIAGNOSIS — Z12.31 ENCOUNTER FOR SCREENING MAMMOGRAM FOR MALIGNANT NEOPLASM OF BREAST: Primary | ICD-10-CM

## 2024-09-26 DIAGNOSIS — Z87.42 HISTORY OF ABNORMAL CERVICAL PAP SMEAR: ICD-10-CM

## 2024-09-26 DIAGNOSIS — Z01.419 ENCOUNTER FOR GYNECOLOGICAL EXAMINATION WITHOUT ABNORMAL FINDING: ICD-10-CM

## 2024-09-26 DIAGNOSIS — N95.2 ATROPHIC VAGINITIS: ICD-10-CM

## 2024-09-26 PROCEDURE — G0101 CA SCREEN;PELVIC/BREAST EXAM: HCPCS | Performed by: OBSTETRICS & GYNECOLOGY

## 2024-09-26 NOTE — PROGRESS NOTES
Assessment/Plan:    No problem-specific Assessment & Plan notes found for this encounter.       Diagnoses and all orders for this visit:    Encounter for screening mammogram for malignant neoplasm of breast    Encounter for gynecological examination without abnormal finding    History of abnormal cervical Pap smear  -     Ambulatory Referral to Obstetrics / Gynecology    Atrophic vaginitis          Normal gynecological physical examination.  Self-breast examination stressed.  Mammogram ordered.  Discussed regular exercise, healthy diet, importance of vitamin D and calcium supplements.  Discussed importance of sun block use during periods of prolonged sun exposure.  Patient will be seen in 1 year for routine gynecologic and medical examination.  Patient will call office for any problems, concerns, or issues which may arise during the interim.     Subjective: Patient is a 68 y.o. postmenopausal female with a PMH significant for a CVA, HTN, HLD, and osteoporosis who presents to the office for her annual GYN examination. Patient has resultant left sided hemiparesis after her stroke. She reports that she tries to remain as active as she can, but is limited due to the left-sided weakness. She endorses a well-balanced diet that includes a multivitamin and vitamin D3. Patient is up to date on all with all screening exams.          HPI    Patient ID: Quynh Larios is a 68 y.o. female who presents today for her annual gynecologic and medical examination    Menstrual status: Patient is a postmenopausal female who denies abnormal vaginal bleeding    Vasomotor symptoms: Denies vasomotor symptoms    Patient reports normal appetite    Patient reports normal bowel and bladder habits    Patient denies any significant pelvic or abdominal pain    Patient denies any headaches, chest pain, shortness of breath fever shakes or chills    Patient denies any COVID 19 symptoms including cough or loss of taste or smell    COVID vaccine status:  Aware of COVID guidelines and recommendations    Medical problems: Patient has a history of a CVA with resultant left sided hemiparesis. She states that it is difficult to remain active. Denies any changes in her medical history     Colonoscopy status: Patient reports she had a colonoscopy last year while she was living in [facility]. She states that they told her she would not require any further screening based on their unremarkable findings.    Mammogram status:Last mammogram was 11/2023. Mammogram is scheduled for 10/8/2024    The following portions of the patient's history were reviewed and updated as appropriate: allergies, current medications, past family history, past medical history, past social history, past surgical history and problem list.    Review of Systems   Constitutional: Negative.  Negative for appetite change, diaphoresis, fatigue, fever and unexpected weight change.   HENT: Negative.     Eyes: Negative.    Respiratory: Negative.  Negative for shortness of breath.    Cardiovascular: Negative.  Negative for chest pain.        Followed for blood pressure   Gastrointestinal: Negative.  Negative for abdominal pain, blood in stool, constipation, diarrhea, nausea and vomiting.   Endocrine: Negative.  Negative for cold intolerance and heat intolerance.        Followed for hypothyroidism, cholesterol, and osteoporosis   Genitourinary: Negative.  Negative for dysuria, frequency, hematuria, pelvic pain, urgency, vaginal bleeding, vaginal discharge and vaginal pain.   Musculoskeletal: Negative.    Skin: Negative.    Allergic/Immunologic: Negative.    Neurological: Negative.         Followed for migraines   Hematological: Negative.  Negative for adenopathy.   Psychiatric/Behavioral: Negative.           Objective:      /72   Wt 81.9 kg (180 lb 9.6 oz)   LMP  (LMP Unknown)   BMI 35.27 kg/m²          Physical Exam  Exam conducted with a chaperone present.   Constitutional:       General: She  is not in acute distress.     Appearance: Normal appearance. She is well-developed. She is not diaphoretic.   HENT:      Head: Normocephalic and atraumatic.   Eyes:      Pupils: Pupils are equal, round, and reactive to light.   Cardiovascular:      Rate and Rhythm: Normal rate and regular rhythm.      Heart sounds: Normal heart sounds. No murmur heard.     No friction rub. No gallop.   Pulmonary:      Effort: Pulmonary effort is normal.      Breath sounds: Normal breath sounds.   Chest:   Breasts:     Breasts are symmetrical.      Right: No inverted nipple, mass, nipple discharge, skin change or tenderness.      Left: No inverted nipple, mass, nipple discharge, skin change or tenderness.   Abdominal:      General: Bowel sounds are normal.      Palpations: Abdomen is soft.   Genitourinary:     General: Normal vulva.      Exam position: Supine.      Labia:         Right: No rash or lesion.         Left: No rash or lesion.       Vagina: Normal. No vaginal discharge, erythema, tenderness or bleeding.      Cervix: No discharge or friability.      Uterus: Not enlarged and not tender.       Adnexa:         Right: No mass, tenderness or fullness.          Left: No mass, tenderness or fullness.        Rectum: Guaiac result negative.      Comments: Mild vaginal dryness and atrophic changes. Good pelvic support confirmed  Musculoskeletal:         General: Normal range of motion.      Cervical back: Normal range of motion and neck supple.   Lymphadenopathy:      Cervical: No cervical adenopathy.      Upper Body:      Right upper body: No supraclavicular adenopathy.      Left upper body: No supraclavicular adenopathy.   Skin:     General: Skin is warm and dry.      Findings: No rash.   Neurological:      General: No focal deficit present.      Mental Status: She is alert and oriented to person, place, and time.   Psychiatric:         Mood and Affect: Mood normal.         Speech: Speech normal.         Behavior: Behavior normal.          Thought Content: Thought content normal.         Judgment: Judgment normal.

## 2024-10-08 ENCOUNTER — IMMUNIZATIONS (OUTPATIENT)
Dept: INTERNAL MEDICINE CLINIC | Facility: OTHER | Age: 69
End: 2024-10-08
Payer: COMMERCIAL

## 2024-10-08 ENCOUNTER — HOSPITAL ENCOUNTER (OUTPATIENT)
Dept: RADIOLOGY | Facility: IMAGING CENTER | Age: 69
Discharge: HOME | End: 2024-10-08
Payer: COMMERCIAL

## 2024-10-08 VITALS — BODY MASS INDEX: 33.04 KG/M2 | HEIGHT: 61 IN | WEIGHT: 175 LBS

## 2024-10-08 DIAGNOSIS — Z12.31 ENCOUNTER FOR SCREENING MAMMOGRAM FOR MALIGNANT NEOPLASM OF BREAST: ICD-10-CM

## 2024-10-08 DIAGNOSIS — Z23 NEEDS FLU SHOT: Primary | ICD-10-CM

## 2024-10-08 PROCEDURE — 77063 BREAST TOMOSYNTHESIS BI: CPT

## 2024-10-08 PROCEDURE — G0008 ADMIN INFLUENZA VIRUS VAC: HCPCS

## 2024-10-08 PROCEDURE — 90662 IIV NO PRSV INCREASED AG IM: CPT

## 2024-10-08 PROCEDURE — 77067 SCR MAMMO BI INCL CAD: CPT

## 2024-10-14 DIAGNOSIS — G43.009 MIGRAINE WITHOUT AURA AND WITHOUT STATUS MIGRAINOSUS, NOT INTRACTABLE: ICD-10-CM

## 2024-10-14 NOTE — TELEPHONE ENCOUNTER
Reason for call:   [x] Refill   [] Prior Auth  [] Other:     Office:   [x] PCP/Provider - Humaira Allen PA-C / [facility] [place]     [] Specialty/Provider -     Medication: Qulipta 60 MG TABS     Dose/Frequency: Take 60 mg by mouth daily     Quantity: 30    Pharmacy: [facility] - [place], [place] - [address]     Does the patient have enough for 3 days?   [] Yes   [x] No - Send as HP to POD

## 2024-10-15 RX ORDER — ATOGEPANT 60 MG/1
60 TABLET ORAL DAILY
Qty: 30 TABLET | Refills: 5 | Status: SHIPPED | OUTPATIENT
Start: 2024-10-15

## 2024-10-16 ENCOUNTER — TELEPHONE (OUTPATIENT)
Dept: INTERNAL MEDICINE CLINIC | Facility: OTHER | Age: 69
End: 2024-10-16

## 2024-10-22 DIAGNOSIS — I10 ESSENTIAL HYPERTENSION: Primary | ICD-10-CM

## 2024-10-22 RX ORDER — AMLODIPINE BESYLATE 5 MG/1
5 TABLET ORAL DAILY
Qty: 90 TABLET | Refills: 1 | Status: SHIPPED | OUTPATIENT
Start: 2024-10-22

## 2024-10-22 NOTE — TELEPHONE ENCOUNTER
Reason for call:   [x] Refill   [] Prior Auth  [] Other:     Office:   [x] PCP/Provider - [facility] [place] /   [] Specialty/Provider -     Medication: amLODIPine (NORVASC) 5 mg tablet     Dose/Frequency: Take 5 mg by mouth daily     Quantity: 90 tablets + 1 refill    Pharmacy: [facility] - [place], [place] - [address]     Does the patient have enough for 3 days?   [] Yes   [x] No - Send as HP to POD

## 2024-11-04 DIAGNOSIS — G43.009 MIGRAINE WITHOUT AURA AND WITHOUT STATUS MIGRAINOSUS, NOT INTRACTABLE: ICD-10-CM

## 2024-11-04 NOTE — TELEPHONE ENCOUNTER
Reason for call:   [x] Refill   [] Prior Auth  [] Other:     Office:   [] PCP/Provider -   [x] Specialty/Provider - Ordering Department: [facility]  Authorized By: Fabiola Mann MD    Medication: Ubrogepant (UBRELVY) 100 MG tablet     Dose/Frequency: Take 1 tablet (100 mg total) by mouth if needed (migraine) can repeat dose in two hours if needed. Do not take more than two doses in 24 hour period     Quantity: 15    Pharmacy: [address] [phone]    Does the patient have enough for 3 days?   [] Yes   [x] No - Send as HP to POD

## 2024-12-30 DIAGNOSIS — G43.009 MIGRAINE WITHOUT AURA AND WITHOUT STATUS MIGRAINOSUS, NOT INTRACTABLE: ICD-10-CM

## 2024-12-30 NOTE — TELEPHONE ENCOUNTER
Not a duplicate  Pharmacy told patient there were no refills on file    Reason for call:   [x] Refill   [] Prior Auth  [] Other:     Office:   [] PCP/Provider -   [x] Specialty/Provider - Neuro    Medication: Ubrogepant (UBRELVY) 100 MG tablet     Dose/Frequency: 100 mg, Oral, As needed     Quantity: 15    Pharmacy:  [facility] [place] [id number]     Does the patient have enough for 3 days?   [x] Yes   [] No - Send as HP to POD

## 2025-01-13 ENCOUNTER — TELEPHONE (OUTPATIENT)
Dept: NEUROLOGY | Facility: CLINIC | Age: 70
End: 2025-01-13

## 2025-01-15 ENCOUNTER — TELEMEDICINE (OUTPATIENT)
Dept: NEUROLOGY | Facility: CLINIC | Age: 70
End: 2025-01-15
Payer: COMMERCIAL

## 2025-01-15 DIAGNOSIS — E78.5 HYPERLIPIDEMIA: ICD-10-CM

## 2025-01-15 DIAGNOSIS — I69.30 HISTORY OF HEMORRHAGIC CEREBROVASCULAR ACCIDENT (CVA) WITH RESIDUAL DEFICIT: ICD-10-CM

## 2025-01-15 DIAGNOSIS — G89.29 CHRONIC NONINTRACTABLE HEADACHE: ICD-10-CM

## 2025-01-15 DIAGNOSIS — I69.154 HEMIPARESIS OF LEFT NONDOMINANT SIDE AS LATE EFFECT OF NONTRAUMATIC INTRACEREBRAL HEMORRHAGE (HCC): Primary | ICD-10-CM

## 2025-01-15 DIAGNOSIS — R51.9 CHRONIC NONINTRACTABLE HEADACHE: ICD-10-CM

## 2025-01-15 PROCEDURE — 98007 SYNCH AUDIO-VIDEO EST HI 40: CPT | Performed by: PSYCHIATRY & NEUROLOGY

## 2025-01-15 PROCEDURE — G2211 COMPLEX E/M VISIT ADD ON: HCPCS | Performed by: PSYCHIATRY & NEUROLOGY

## 2025-01-15 NOTE — ASSESSMENT & PLAN NOTE
Mri brain from 2019 showed R BG hemorrhage which is likely hypertensive  It was from August 2019  BP has been better controlled she states  No new symptoms.   Patient is on aspirin, and atorvastatin

## 2025-01-15 NOTE — PROGRESS NOTES
"Virtual Regular Visit  Name: Quynh Larios      : 1955      MRN: [id number]  Encounter Provider: Fabiola Mann MD  Encounter Date: 1/15/2025   Encounter department: [facility] [place]      Verification of patient location:  Patient is located at Home in the following state in which I hold an active license [place] :  Assessment & Plan  Hemiparesis of left nondominant side as late effect of nontraumatic intracerebral hemorrhage (HCC)  Mri brain from 2019 showed R BG hemorrhage which is likely hypertensive  It was from 2019  BP has been better controlled she states  No new symptoms.   Patient is on aspirin, and atorvastatin       Hyperlipidemia  Continue with current regimen       History of hemorrhagic cerebrovascular accident (CVA) with residual deficit         Chronic nonintractable headache  Current  treatment plan -   Preventative medications:    continue with qulipta, and propanolol   Failed medications in the past - several including botox injections     Abortive medications:   continue with ubrelvy    Imaging - not at this time.     Lifestyle Modifications:  1.Maintain headache diary.  We discussed an GLENN for a smart phone is \"Migraine kosta\"  2. When patient has a moderate to severe headache, they should seek rest, initiate relaxation and apply cold compresses to the head.   3. Hydration - Please drink at least 64 ounces of water a day to help remain hydrated.  4. Sleep -  Maintain regular sleep schedule. Adults need at least 7-8 hours of uninterrupted a night. Maintain good sleep hygiene:Adults need at least 7-8 hours of uninterrupted a night.   5. Diet - Patient is to have regular frequent meals to prevent headache onset. Avoid dietary trigger. (aged cheese, peanuts, MSG, aspartame and nitrates).  6. Medications - Limit over the counter medications such as Tylenol, Ibuprofen, Aleve, Excedrin. (No more than 3 times a week).  7. Exercise - Daily exercise is not only good for " "your heart but also good for your mental health. Recommend 4-5 times a week for 20 minutes.        Follow up in 6 months     I would be happy to see the patient sooner if any new questions/concerns arise.  Patient/Guardian was advised to the call the office if they have any questions and concerns in the meantime.     We discussed \"red flag\" headache symptoms including symptoms such as facial droop on one side, weakness/paralysis on either side, speech trouble, numbness on one side, balance issues, any vision changes, extreme dizziness or significant increase in severity of headache or a sudden onset severe headache, patient is to call 9-1-1 immediately or to proceed to the nearest ER.       Encounter provider Fabiola Mann MD    The patient was identified by name and date of birth. Quynh Larios was informed that this is a telemedicine visit and that the visit is being conducted through the Epic Embedded platform. She agrees to proceed..  My office door was closed. No one else was in the room.  She acknowledged consent and understanding of privacy and security of the video platform. The patient has agreed to participate and understands they can discontinue the visit at any time.    Patient is aware this is a billable service.     History of Present Illness     HPI    This is a 68 y/o Female who is here as a history of right basal ganglia hemorrhage.    Patient is otherwise doing well and denies any new TIA/CVA like symptoms. She says that her headaches are much better and has them maybe once every 2 weeks, and she has responded well to qulipta without any side effects.  She denies any constipation. She is done with PT, still has weakness on the L side, but is able to ambulate with a cane. She is relieved that her headaches have improved drastically With the Qulipta.  And she is grateful.  She has occasional headaches maybe once every 2 weeks but overall nothing compared to what was before.      Review of Systems "   Constitutional: Negative.    HENT: Negative.     Eyes: Negative.    Respiratory: Negative.     Cardiovascular: Negative.    Gastrointestinal: Negative.    Endocrine: Negative.    Genitourinary: Negative.    Musculoskeletal: Negative.    Skin: Negative.    Allergic/Immunologic: Negative.    Neurological: Negative.    Hematological: Negative.    Psychiatric/Behavioral: Negative.     All other systems reviewed and are negative.      Objective   LMP  (LMP Unknown)     Physical Exam    Visit Time  Total Visit Duration: 40

## 2025-01-15 NOTE — ASSESSMENT & PLAN NOTE
"Current  treatment plan -   Preventative medications:    continue with qulipta, and propanolol   Failed medications in the past - several including botox injections     Abortive medications:   continue with ubrelvy    Imaging - not at this time.     Lifestyle Modifications:  1.Maintain headache diary.  We discussed an GLENN for a smart phone is \"Migraine kosta\"  2. When patient has a moderate to severe headache, they should seek rest, initiate relaxation and apply cold compresses to the head.   3. Hydration - Please drink at least 64 ounces of water a day to help remain hydrated.  4. Sleep -  Maintain regular sleep schedule. Adults need at least 7-8 hours of uninterrupted a night. Maintain good sleep hygiene:Adults need at least 7-8 hours of uninterrupted a night.   5. Diet - Patient is to have regular frequent meals to prevent headache onset. Avoid dietary trigger. (aged cheese, peanuts, MSG, aspartame and nitrates).  6. Medications - Limit over the counter medications such as Tylenol, Ibuprofen, Aleve, Excedrin. (No more than 3 times a week).  7. Exercise - Daily exercise is not only good for your heart but also good for your mental health. Recommend 4-5 times a week for 20 minutes.        "

## 2025-01-23 ENCOUNTER — OFFICE VISIT (OUTPATIENT)
Dept: INTERNAL MEDICINE CLINIC | Facility: OTHER | Age: 70
End: 2025-01-23
Payer: COMMERCIAL

## 2025-01-23 VITALS
HEART RATE: 60 BPM | TEMPERATURE: 97.7 F | DIASTOLIC BLOOD PRESSURE: 76 MMHG | OXYGEN SATURATION: 97 % | SYSTOLIC BLOOD PRESSURE: 120 MMHG | BODY MASS INDEX: 34.48 KG/M2 | WEIGHT: 182.6 LBS | HEIGHT: 61 IN

## 2025-01-23 DIAGNOSIS — F32.1 CURRENT MODERATE EPISODE OF MAJOR DEPRESSIVE DISORDER WITHOUT PRIOR EPISODE (HCC): ICD-10-CM

## 2025-01-23 DIAGNOSIS — R19.5 LOOSE STOOLS: Primary | ICD-10-CM

## 2025-01-23 DIAGNOSIS — I10 ESSENTIAL HYPERTENSION: ICD-10-CM

## 2025-01-23 DIAGNOSIS — K21.9 GASTROESOPHAGEAL REFLUX DISEASE, UNSPECIFIED WHETHER ESOPHAGITIS PRESENT: ICD-10-CM

## 2025-01-23 DIAGNOSIS — I69.154 HEMIPARESIS OF LEFT NONDOMINANT SIDE AS LATE EFFECT OF NONTRAUMATIC INTRACEREBRAL HEMORRHAGE (HCC): ICD-10-CM

## 2025-01-23 PROCEDURE — G2211 COMPLEX E/M VISIT ADD ON: HCPCS

## 2025-01-23 PROCEDURE — 99214 OFFICE O/P EST MOD 30 MIN: CPT

## 2025-01-23 RX ORDER — PANTOPRAZOLE SODIUM 20 MG/1
20 TABLET, DELAYED RELEASE ORAL DAILY
Qty: 30 TABLET | Refills: 5 | Status: SHIPPED | OUTPATIENT
Start: 2025-01-23

## 2025-01-23 RX ORDER — SACCHAROMYCES BOULARDII 250 MG
250 CAPSULE ORAL 2 TIMES DAILY
Qty: 60 CAPSULE | Refills: 5 | Status: SHIPPED | OUTPATIENT
Start: 2025-01-23

## 2025-01-23 NOTE — ASSESSMENT & PLAN NOTE
Patient previously on pantoprazole, has not been taking over last several months.  She endorses continued GERD symptoms.  Will restart pantoprazole    Orders:    pantoprazole (PROTONIX) 20 mg tablet; Take 1 tablet (20 mg total) by mouth daily

## 2025-01-23 NOTE — PROGRESS NOTES
Name: Quynh Larios      : 1955      MRN: [id number]  Encounter Provider: Humaira Allen PA-C  Encounter Date: 2025   Encounter department: [facility] [place]  :  Assessment & Plan  Loose stools  Patient notes that she did have a stomach bug approximately one 1 month ago.  Lasted for approximately a week then resolved.  She now endorses 1 episode of diarrhea at least once weekly.  She does have intermittent normal stools between these episodes  Abdomen nontender to palpation  Patient does have a history of normal colonoscopies.  States her last was approximately  with [employer].  She was advised that she needs no further colonoscopies at that time  Will order stool studies for further evaluation to rule out infectious causes  Recommend probiotic twice daily, increased hydration.  Maintaining adequate fiber in diet  Recommend avoidance of antidiarrheal agents until infection is ruled out  Follow-up 1 to 2 weeks or sooner if symptoms worsen  Orders:    Stool Enteric Bacterial Panel by PCR; Future    Ova and parasite examination; Future    Giardia antigen; Future    Clostridium difficile toxin by PCR with EIA; Future    saccharomyces boulardii (FLORASTOR) 250 mg capsule; Take 1 capsule (250 mg total) by mouth 2 (two) times a day    Gastroesophageal reflux disease, unspecified whether esophagitis present  Patient previously on pantoprazole, has not been taking over last several months.  She endorses continued GERD symptoms.  Will restart pantoprazole    Orders:    pantoprazole (PROTONIX) 20 mg tablet; Take 1 tablet (20 mg total) by mouth daily    Hemiparesis of left nondominant side as late effect of nontraumatic intracerebral hemorrhage (HCC)  Residual left-sided facial droop, LUE > LLE weakness following CVA in   Continue home exercises as provided by PT OT to improve hand strength and balance             Current moderate episode of major depressive disorder  without prior episode (HCC)  Controlled with Zoloft 75 mg daily  Discussed referral to talk therapy to which patient declined            Essential hypertension  Controlled with amlodipine 5 mg daily and propranolol 60 mg twice daily  Continue low-salt diet, monitoring blood pressure at home         History of Present Illness   Patient is a 69-year-old female presenting to the office for concerns of diarrhea  She reports he had the stomach bug prior to 12/25/2024, which lasted approximately 5 days  Since this time, patient has had episodes of diarrhea at least once per week  She notes between these episodes she will have normal stools  She reports she is taking loperamide which does resolve the diarrhea  Denies blood in stool  She notes abdominal pain with diarrhea, denies no abdominal pain between episodes  Denies fevers, chills, nausea, vomiting  No recent antibiotic use      Colonoscopy status: Patient reports she had a colonoscopy last year while she was living in [facility]. She states that they told her she would not require any further screening based on their unremarkable findings.     Diarrhea   This is a new problem. The current episode started more than 1 month ago. The problem occurs less than 2 times per day. The patient states that diarrhea awakens her from sleep. Associated symptoms include abdominal pain (with diarrhea). Pertinent negatives include no bloating, chills, coughing, fever, headaches, increased  flatus, sweats, URI or vomiting. Nothing aggravates the symptoms. There are no known risk factors. She has tried anti-motility drug for the symptoms.             Review of Systems   Constitutional:  Negative for chills, fatigue and fever.   HENT:  Negative for congestion, ear pain, postnasal drip, rhinorrhea, sinus pressure, sore throat and trouble swallowing.    Eyes:  Negative for pain and visual disturbance.   Respiratory:  Negative for cough, shortness of breath and wheezing.   "  Cardiovascular:  Negative for chest pain, palpitations and leg swelling.   Gastrointestinal:  Positive for abdominal pain (with diarrhea) and diarrhea. Negative for bloating, constipation, flatus, nausea and vomiting.   Genitourinary:  Negative for difficulty urinating, dysuria, frequency, hematuria and urgency.   Neurological:  Negative for dizziness, weakness, light-headedness, numbness and headaches.   Psychiatric/Behavioral:  Negative for dysphoric mood and sleep disturbance. The patient is not nervous/anxious.    All other systems reviewed and are negative.      Objective   /76 (BP Location: Left arm, Patient Position: Sitting, Cuff Size: Standard)   Pulse 60   Temp 97.7 °F (36.5 °C) (Temporal)   Ht 5' 1\" (1.549 m)   Wt 82.8 kg (182 lb 9.6 oz)   LMP  (LMP Unknown)   SpO2 97%   BMI 34.50 kg/m²      Physical Exam  Vitals and nursing note reviewed.   Constitutional:       General: She is not in acute distress.     Appearance: Normal appearance. She is not ill-appearing.   HENT:      Head: Normocephalic and atraumatic.      Right Ear: External ear normal.      Left Ear: External ear normal.      Nose: Nose normal.      Mouth/Throat:      Mouth: Mucous membranes are moist.      Pharynx: Oropharynx is clear.   Eyes:      General: No scleral icterus.     Conjunctiva/sclera: Conjunctivae normal.      Pupils: Pupils are equal, round, and reactive to light.   Cardiovascular:      Rate and Rhythm: Normal rate and regular rhythm.      Heart sounds: Normal heart sounds. No murmur heard.     No friction rub. No gallop.   Pulmonary:      Effort: Pulmonary effort is normal. No respiratory distress.      Breath sounds: Normal breath sounds. No wheezing, rhonchi or rales.   Chest:      Chest wall: No tenderness.   Abdominal:      General: Abdomen is flat.      Tenderness: There is no abdominal tenderness. There is no right CVA tenderness, left CVA tenderness, guarding or rebound.   Skin:     General: Skin is warm " and dry.      Coloration: Skin is not pale.      Findings: No erythema.   Neurological:      General: No focal deficit present.      Mental Status: She is alert and oriented to person, place, and time. Mental status is at baseline.   Psychiatric:         Mood and Affect: Mood normal.         Behavior: Behavior normal.           Disclaimer: This note was generated with voice recognition software.  Phonetic, grammatical, and spelling errors may be present as a result.  Please contact provider with any concerns or questions

## 2025-01-23 NOTE — ASSESSMENT & PLAN NOTE
Controlled with Zoloft 75 mg daily  Discussed referral to talk therapy to which patient declined

## 2025-01-23 NOTE — ASSESSMENT & PLAN NOTE
Residual left-sided facial droop, LUE > LLE weakness following CVA in 2021  Continue home exercises as provided by PT OT to improve hand strength and balance

## 2025-01-24 ENCOUNTER — TELEPHONE (OUTPATIENT)
Dept: ADMINISTRATIVE | Facility: OTHER | Age: 70
End: 2025-01-24

## 2025-01-24 NOTE — TELEPHONE ENCOUNTER
----- Message from Joycelyn DOZIER sent at 1/23/2025 12:12 PM EST -----  ...01/23/25 12:13 PM    Hello, our patient Quynh Larios has had CRC: Colonoscopy completed/performed. Please assist in updating the patient chart by pulling the Care Everywhere (CE) document. The date of service is 02/22/2022. Done at [facility], [address] [facility] [place] [address] by Emiliano Hilliard MD.      Thank you,  Joycelyn Schmidt MA  [facility]

## 2025-01-24 NOTE — TELEPHONE ENCOUNTER
Upon review of the In Basket request we were able to locate, review, and update the patient chart as requested for CRC: Colonoscopy.    Any additional questions or concerns should be emailed to the Practice Liaisons via the appropriate education email address, please do not reply via In Basket.    Thank you  Gi Persaud   [facility]

## 2025-01-24 NOTE — TELEPHONE ENCOUNTER
Upon review of the In Basket request and the patient's chart, initial outreach has been made via telephone call to facility. Please see Contacts section for details.     X1 call - colo    Thank you  Gi Persaud

## 2025-01-24 NOTE — TELEPHONE ENCOUNTER
----- Message from Humaira Allen PA-C sent at 1/23/2025  1:55 PM EST -----  01/23/25 1:56 PM    Hello, our patient Quynh Larios has had CRC: Colonoscopy completed/performed. Please assist in updating the patient chart by making an External outreach to [facility] facility located in [place], [place]. The date of service is approximately 2023.    Thank you,  Humaira Allen PA-C  [facility] [place]

## 2025-01-24 NOTE — TELEPHONE ENCOUNTER
Upon review of the In Basket request we were able to note that no further action is required. The patient chart is up to date as a result of a previous request.      ANOTHER ANALYST IS MAKING OUTREACHES       Any additional questions or concerns should be emailed to the Practice Liaisons via the appropriate education email address, please do not reply via In Basket.    Thank you  Rayne Jacques MA   [facility]

## 2025-01-27 ENCOUNTER — APPOINTMENT (OUTPATIENT)
Dept: LAB | Facility: IMAGING CENTER | Age: 70
End: 2025-01-27
Payer: COMMERCIAL

## 2025-01-27 DIAGNOSIS — R19.5 LOOSE STOOLS: ICD-10-CM

## 2025-01-27 PROCEDURE — 87506 IADNA-DNA/RNA PROBE TQ 6-11: CPT

## 2025-01-27 PROCEDURE — 87209 SMEAR COMPLEX STAIN: CPT

## 2025-01-27 PROCEDURE — 87177 OVA AND PARASITES SMEARS: CPT

## 2025-01-28 LAB
C COLI+JEJUNI TUF STL QL NAA+PROBE: NEGATIVE
C DIFF TOX GENS STL QL NAA+PROBE: NEGATIVE
EC STX1+STX2 GENES STL QL NAA+PROBE: NEGATIVE
SALMONELLA SP SPAO STL QL NAA+PROBE: NEGATIVE
SHIGELLA SP+EIEC IPAH STL QL NAA+PROBE: NEGATIVE

## 2025-01-29 ENCOUNTER — RESULTS FOLLOW-UP (OUTPATIENT)
Dept: INTERNAL MEDICINE CLINIC | Facility: OTHER | Age: 70
End: 2025-01-29

## 2025-01-29 LAB — G LAMBLIA AG STL QL IA: NEGATIVE

## 2025-02-05 ENCOUNTER — TELEPHONE (OUTPATIENT)
Age: 70
End: 2025-02-05

## 2025-02-05 NOTE — TELEPHONE ENCOUNTER
Pt called she now has Kurt PEREZ and Qulipta requires a prior authorization. Pt has 10 tabs left. Clinical team: please submit the prior auth.

## 2025-02-08 ENCOUNTER — APPOINTMENT (OUTPATIENT)
Dept: LAB | Facility: IMAGING CENTER | Age: 70
End: 2025-02-08
Payer: COMMERCIAL

## 2025-02-08 DIAGNOSIS — E78.2 MIXED HYPERLIPIDEMIA: ICD-10-CM

## 2025-02-08 DIAGNOSIS — E03.9 HYPOTHYROIDISM, UNSPECIFIED TYPE: ICD-10-CM

## 2025-02-08 LAB
ALBUMIN SERPL BCG-MCNC: 4.4 G/DL (ref 3.5–5)
ALP SERPL-CCNC: 61 U/L (ref 34–104)
ALT SERPL W P-5'-P-CCNC: 42 U/L (ref 7–52)
ANION GAP SERPL CALCULATED.3IONS-SCNC: 8 MMOL/L (ref 4–13)
AST SERPL W P-5'-P-CCNC: 24 U/L (ref 13–39)
BASOPHILS # BLD MANUAL: 0 THOUSAND/UL (ref 0–0.1)
BASOPHILS NFR MAR MANUAL: 0 % (ref 0–1)
BILIRUB SERPL-MCNC: 0.52 MG/DL (ref 0.2–1)
BUN SERPL-MCNC: 14 MG/DL (ref 5–25)
CALCIUM SERPL-MCNC: 9.1 MG/DL (ref 8.4–10.2)
CHLORIDE SERPL-SCNC: 101 MMOL/L (ref 96–108)
CHOLEST SERPL-MCNC: 154 MG/DL (ref ?–200)
CO2 SERPL-SCNC: 29 MMOL/L (ref 21–32)
CREAT SERPL-MCNC: 0.57 MG/DL (ref 0.6–1.3)
EOSINOPHIL # BLD MANUAL: 0.27 THOUSAND/UL (ref 0–0.4)
EOSINOPHIL NFR BLD MANUAL: 2 % (ref 0–6)
ERYTHROCYTE [DISTWIDTH] IN BLOOD BY AUTOMATED COUNT: 12.5 % (ref 11.6–15.1)
GFR SERPL CREATININE-BSD FRML MDRD: 94 ML/MIN/1.73SQ M
GLUCOSE P FAST SERPL-MCNC: 99 MG/DL (ref 65–99)
HCT VFR BLD AUTO: 44.9 % (ref 34.8–46.1)
HDLC SERPL-MCNC: 50 MG/DL
HGB BLD-MCNC: 14.8 G/DL (ref 11.5–15.4)
LDLC SERPL CALC-MCNC: 82 MG/DL (ref 0–100)
LYMPHOCYTES # BLD AUTO: 34 % (ref 14–44)
LYMPHOCYTES # BLD AUTO: 5.34 THOUSAND/UL (ref 0.6–4.47)
MCH RBC QN AUTO: 30.6 PG (ref 26.8–34.3)
MCHC RBC AUTO-ENTMCNC: 33 G/DL (ref 31.4–37.4)
MCV RBC AUTO: 93 FL (ref 82–98)
MONOCYTES # BLD AUTO: 0.8 THOUSAND/UL (ref 0–1.22)
MONOCYTES NFR BLD: 6 % (ref 4–12)
NEUTROPHILS # BLD MANUAL: 6.94 THOUSAND/UL (ref 1.85–7.62)
NEUTS SEG NFR BLD AUTO: 52 % (ref 43–75)
PLATELET # BLD AUTO: 284 THOUSANDS/UL (ref 149–390)
PLATELET BLD QL SMEAR: ADEQUATE
PMV BLD AUTO: 9.9 FL (ref 8.9–12.7)
POTASSIUM SERPL-SCNC: 4.1 MMOL/L (ref 3.5–5.3)
PROT SERPL-MCNC: 6.7 G/DL (ref 6.4–8.4)
RBC # BLD AUTO: 4.84 MILLION/UL (ref 3.81–5.12)
RBC MORPH BLD: NORMAL
SODIUM SERPL-SCNC: 138 MMOL/L (ref 135–147)
TRIGL SERPL-MCNC: 110 MG/DL (ref ?–150)
TSH SERPL DL<=0.05 MIU/L-ACNC: 1.68 UIU/ML (ref 0.45–4.5)
VARIANT LYMPHS # BLD AUTO: 6 %
WBC # BLD AUTO: 13.35 THOUSAND/UL (ref 4.31–10.16)

## 2025-02-08 PROCEDURE — 36415 COLL VENOUS BLD VENIPUNCTURE: CPT

## 2025-02-08 PROCEDURE — 84443 ASSAY THYROID STIM HORMONE: CPT

## 2025-02-08 PROCEDURE — 80061 LIPID PANEL: CPT

## 2025-02-08 PROCEDURE — 85007 BL SMEAR W/DIFF WBC COUNT: CPT

## 2025-02-08 PROCEDURE — 85027 COMPLETE CBC AUTOMATED: CPT

## 2025-02-08 PROCEDURE — 80053 COMPREHEN METABOLIC PANEL: CPT

## 2025-02-10 ENCOUNTER — TELEPHONE (OUTPATIENT)
Age: 70
End: 2025-02-10

## 2025-02-10 ENCOUNTER — RESULTS FOLLOW-UP (OUTPATIENT)
Dept: INTERNAL MEDICINE CLINIC | Facility: OTHER | Age: 70
End: 2025-02-10

## 2025-02-10 NOTE — TELEPHONE ENCOUNTER
Patient called in and is requesting a call from Dr. Mann .    Pt is asking if its possible to have a letter generated for her stating if she can appear remotely to the court as she can not drive  due to the stroke she had.     Please assist .     Her court date: 2/12/25    Pt is requesting a call back ASAP at phone # [phone]

## 2025-02-10 NOTE — TELEPHONE ENCOUNTER
Qulipta is approved through 12/31/2025. Called [facility] and made the pharmacist aware of the approval. Sent pt a message through [employer]

## 2025-02-11 DIAGNOSIS — D72.820 LYMPHOCYTOSIS: Primary | ICD-10-CM

## 2025-02-11 DIAGNOSIS — M81.0 AGE-RELATED OSTEOPOROSIS WITHOUT CURRENT PATHOLOGICAL FRACTURE: ICD-10-CM

## 2025-02-11 RX ORDER — ALENDRONATE SODIUM 70 MG/1
70 TABLET ORAL
Qty: 12 TABLET | Refills: 1 | Status: SHIPPED | OUTPATIENT
Start: 2025-02-11

## 2025-02-11 NOTE — TELEPHONE ENCOUNTER
Reason for call:   [x] Refill   [] Prior Auth  [] Other:     Office:   [x] PCP/Provider -   [] Specialty/Provider -     Medication: alendronate (FOSAMAX) 70 mg tablet Take 1 tablet (70 mg total) by mouth every 7 days TAKE 1 TABLET (70 MG TOTAL) BY MOUTH ONCE A WEEK TAKE IN THE MORNING WITH A FULL GLASS OF WATER, ON AN EMPTY STOMACH, AND DO NOT TAKE ANYTHING ELSE BY MOUTH OR LIE DOWN FOR THE NEXT 30 MIN       Pharmacy: [facility] [place] [facility] [place] [facility]      Does the patient have enough for 3 days?   [] Yes   [x] No - Send as HP to POD

## 2025-02-12 NOTE — TELEPHONE ENCOUNTER
Called to verify details of letter requested above, pt notified this letter is not for an exemption of jury duty but rather for a personal matter. After provider was informed, provider advised this would be a legal matter and does not  feel comfortable completing this, notified pt of providers advice pt demonstrated understanding, thank you!

## 2025-02-24 ENCOUNTER — RA CDI HCC (OUTPATIENT)
Dept: OTHER | Facility: HOSPITAL | Age: 70
End: 2025-02-24

## 2025-02-25 ENCOUNTER — TELEPHONE (OUTPATIENT)
Dept: NEUROLOGY | Facility: CLINIC | Age: 70
End: 2025-02-25

## 2025-02-25 NOTE — TELEPHONE ENCOUNTER
[facility] via [employer] from [facility] Ver[facility] Chronic Condition Form to be completed for patient.  Form scanned into ..

## 2025-02-28 ENCOUNTER — APPOINTMENT (OUTPATIENT)
Dept: LAB | Facility: IMAGING CENTER | Age: 70
End: 2025-02-28
Payer: COMMERCIAL

## 2025-02-28 DIAGNOSIS — D72.820 LYMPHOCYTOSIS: ICD-10-CM

## 2025-02-28 LAB
BASOPHILS # BLD AUTO: 0.04 THOUSANDS/ÂΜL (ref 0–0.1)
BASOPHILS NFR BLD AUTO: 0 % (ref 0–1)
EOSINOPHIL # BLD AUTO: 0.08 THOUSAND/ÂΜL (ref 0–0.61)
EOSINOPHIL NFR BLD AUTO: 1 % (ref 0–6)
ERYTHROCYTE [DISTWIDTH] IN BLOOD BY AUTOMATED COUNT: 12.7 % (ref 11.6–15.1)
HCT VFR BLD AUTO: 45 % (ref 34.8–46.1)
HGB BLD-MCNC: 14.8 G/DL (ref 11.5–15.4)
IMM GRANULOCYTES # BLD AUTO: 0.03 THOUSAND/UL (ref 0–0.2)
IMM GRANULOCYTES NFR BLD AUTO: 0 % (ref 0–2)
LYMPHOCYTES # BLD AUTO: 4.45 THOUSANDS/ÂΜL (ref 0.6–4.47)
LYMPHOCYTES NFR BLD AUTO: 47 % (ref 14–44)
MCH RBC QN AUTO: 30.4 PG (ref 26.8–34.3)
MCHC RBC AUTO-ENTMCNC: 32.9 G/DL (ref 31.4–37.4)
MCV RBC AUTO: 92 FL (ref 82–98)
MONOCYTES # BLD AUTO: 0.58 THOUSAND/ÂΜL (ref 0.17–1.22)
MONOCYTES NFR BLD AUTO: 6 % (ref 4–12)
NEUTROPHILS # BLD AUTO: 4.38 THOUSANDS/ÂΜL (ref 1.85–7.62)
NEUTS SEG NFR BLD AUTO: 46 % (ref 43–75)
NRBC BLD AUTO-RTO: 0 /100 WBCS
PLATELET # BLD AUTO: 266 THOUSANDS/UL (ref 149–390)
PMV BLD AUTO: 10 FL (ref 8.9–12.7)
RBC # BLD AUTO: 4.87 MILLION/UL (ref 3.81–5.12)
WBC # BLD AUTO: 9.56 THOUSAND/UL (ref 4.31–10.16)

## 2025-02-28 PROCEDURE — 36415 COLL VENOUS BLD VENIPUNCTURE: CPT

## 2025-02-28 PROCEDURE — 85025 COMPLETE CBC W/AUTO DIFF WBC: CPT

## 2025-03-04 ENCOUNTER — HOSPITAL ENCOUNTER (OUTPATIENT)
Dept: RADIOLOGY | Facility: IMAGING CENTER | Age: 70
Discharge: HOME | End: 2025-03-04
Payer: COMMERCIAL

## 2025-03-04 ENCOUNTER — OFFICE VISIT (OUTPATIENT)
Dept: INTERNAL MEDICINE CLINIC | Facility: OTHER | Age: 70
End: 2025-03-04
Payer: COMMERCIAL

## 2025-03-04 VITALS
WEIGHT: 181 LBS | SYSTOLIC BLOOD PRESSURE: 124 MMHG | HEART RATE: 68 BPM | TEMPERATURE: 98.2 F | BODY MASS INDEX: 34.17 KG/M2 | OXYGEN SATURATION: 97 % | DIASTOLIC BLOOD PRESSURE: 76 MMHG | HEIGHT: 61 IN

## 2025-03-04 DIAGNOSIS — M81.0 AGE-RELATED OSTEOPOROSIS WITHOUT CURRENT PATHOLOGICAL FRACTURE: ICD-10-CM

## 2025-03-04 DIAGNOSIS — M17.32 POST-TRAUMATIC OSTEOARTHRITIS OF LEFT KNEE: ICD-10-CM

## 2025-03-04 DIAGNOSIS — N32.81 OVERACTIVE BLADDER: ICD-10-CM

## 2025-03-04 DIAGNOSIS — G43.009 MIGRAINE WITHOUT AURA AND WITHOUT STATUS MIGRAINOSUS, NOT INTRACTABLE: ICD-10-CM

## 2025-03-04 DIAGNOSIS — I69.154 HEMIPARESIS OF LEFT NONDOMINANT SIDE AS LATE EFFECT OF NONTRAUMATIC INTRACEREBRAL HEMORRHAGE (HCC): ICD-10-CM

## 2025-03-04 DIAGNOSIS — I69.30 HISTORY OF HEMORRHAGIC CEREBROVASCULAR ACCIDENT (CVA) WITH RESIDUAL DEFICIT: ICD-10-CM

## 2025-03-04 DIAGNOSIS — I10 ESSENTIAL HYPERTENSION: ICD-10-CM

## 2025-03-04 DIAGNOSIS — E55.9 VITAMIN D DEFICIENCY: ICD-10-CM

## 2025-03-04 DIAGNOSIS — F32.1 CURRENT MODERATE EPISODE OF MAJOR DEPRESSIVE DISORDER WITHOUT PRIOR EPISODE (HCC): ICD-10-CM

## 2025-03-04 DIAGNOSIS — M25.552 LEFT HIP PAIN: ICD-10-CM

## 2025-03-04 DIAGNOSIS — E78.2 MIXED HYPERLIPIDEMIA: Primary | ICD-10-CM

## 2025-03-04 DIAGNOSIS — R73.01 IMPAIRED FASTING GLUCOSE: ICD-10-CM

## 2025-03-04 DIAGNOSIS — E03.9 ACQUIRED HYPOTHYROIDISM: ICD-10-CM

## 2025-03-04 PROCEDURE — 73502 X-RAY EXAM HIP UNI 2-3 VIEWS: CPT

## 2025-03-04 PROCEDURE — G2211 COMPLEX E/M VISIT ADD ON: HCPCS

## 2025-03-04 PROCEDURE — 73562 X-RAY EXAM OF KNEE 3: CPT

## 2025-03-04 PROCEDURE — 99214 OFFICE O/P EST MOD 30 MIN: CPT

## 2025-03-04 RX ORDER — VIBEGRON 75 MG/1
75 TABLET, FILM COATED ORAL DAILY
Qty: 90 TABLET | Refills: 1 | Status: SHIPPED | OUTPATIENT
Start: 2025-03-04

## 2025-03-04 RX ORDER — METHYLPREDNISOLONE 4 MG/1
TABLET ORAL
COMMUNITY
Start: 2025-02-06 | End: 2025-03-04

## 2025-03-04 RX ORDER — MELOXICAM 15 MG/1
TABLET ORAL
COMMUNITY
Start: 2025-02-14 | End: 2025-03-04 | Stop reason: ALTCHOICE

## 2025-03-04 NOTE — ASSESSMENT & PLAN NOTE
Patient noting worsening left knee pain and left hip pain  Will order x-rays for further evaluation given that she has no recent imaging on chart  Continue Tylenol.  May take 1000 mg 3 times daily.  Max daily dose 3000 mg daily  Use Voltaren gel topically  Will refer to PT as she had some success with PT in the past  Patient also requesting referral to orthopedic surgery for consideration of CSI injections  Orders:    XR knee 3 vw left non injury; Future    Ambulatory Referral to Orthopedic Surgery; Future    EXTERNAL Referral to Home Health; Future    Diclofenac Sodium (VOLTAREN) 1 %; Apply 2 g topically 3 (three) times a day as needed (knee or hip pain)

## 2025-03-04 NOTE — ASSESSMENT & PLAN NOTE
A1c 5.6.  Continue healthy diet and exercise habits  Will continue to monitor A1c        Orders:    Hemoglobin A1C; Future

## 2025-03-04 NOTE — ASSESSMENT & PLAN NOTE
Currently on aspirin 81 mg daily and atorvastatin 40 mg daily  Follows with neurology        Orders:    Lipid Panel with Direct LDL reflex; Future

## 2025-03-04 NOTE — ASSESSMENT & PLAN NOTE
Controlled with amlodipine 5 mg daily and propranolol 60 mg twice daily  Continue low-salt diet, monitoring blood pressure at home    Orders:    Comprehensive metabolic panel; Future    CBC and differential; Future

## 2025-03-04 NOTE — PROGRESS NOTES
Name: Quynh Larios      : 1955      MRN: [id number]  Encounter Provider: Humaira Allen PA-C  Encounter Date: 3/4/2025   Encounter department: [facility] [place]  :  Assessment & Plan  Mixed hyperlipidemia  Lipid panel: Total cholesterol 154, triglycerides 110, HDL 50, LDL 82  Improvement noted.  Continue atorvastatin 40 mg daily  Recommend healthy lifestyle choices for your cholesterol.  Low fat/low cholesterol diet.  Limit/avoid red meat.  Eat more lean meat - chicken breast, ground turkey, fish.  Exercise 30 mins at least 5 times a week as tolerated.              Vitamin D deficiency  Continue vitamin D supplementation, increase to 2000IU daily for low normal Vitamin D    Orders:    Vitamin D 25 hydroxy; Future    History of hemorrhagic cerebrovascular accident (CVA) with residual deficit  Currently on aspirin 81 mg daily and atorvastatin 40 mg daily  Follows with neurology        Orders:    Lipid Panel with Direct LDL reflex; Future    Current moderate episode of major depressive disorder without prior episode (HCC)  Controlled with Zoloft 75 mg daily  Discussed referral to talk therapy to which patient declined              Age-related osteoporosis without current pathological fracture  Continue vitamin D and calcium supplementation  Continue Fosamax 70 mg weekly  Continue weightbearing exercise  Repeat DEXA scan.  Patient with mild osteoporosis in the left femoral neck which appears overall unchanged from previous  We will continue current regimen as above           Hemiparesis of left nondominant side as late effect of nontraumatic intracerebral hemorrhage (HCC)  Residual left-sided facial droop, LUE > LLE weakness following CVA in   Continue home exercises as provided by PT OT to improve hand strength and balance             Impaired fasting glucose  A1c 5.6.  Continue healthy diet and exercise habits  Will continue to monitor A1c        Orders:    Hemoglobin A1C;  Future    Acquired hypothyroidism  TSH WNL.  Continue Synthroid 75 mcg daily    Orders:    TSH, 3rd generation with Free T4 reflex; Future    Migraine without aura and without status migrainosus, not intractable  Follows with neurology  Current  treatment plan -   Preventative medications:  continue with quilpta, propanolol  Abortive medications: continue with ubrelvy 100mg as needed            Essential hypertension  Controlled with amlodipine 5 mg daily and propranolol 60 mg twice daily  Continue low-salt diet, monitoring blood pressure at home    Orders:    Comprehensive metabolic panel; Future    CBC and differential; Future    Overactive bladder  Well-controlled with Gemtesa 75 mg daily.  Also has a history of implantable device to help control overactive bladder, done in [place]  Offered urology referral for follow-up with patient, she declined at this time  Orders:    Gemtesa 75 MG TABS; Take 75 mg by mouth in the morning    Post-traumatic osteoarthritis of left knee  Patient noting worsening left knee pain and left hip pain  Will order x-rays for further evaluation given that she has no recent imaging on chart  Continue Tylenol.  May take 1000 mg 3 times daily.  Max daily dose 3000 mg daily  Use Voltaren gel topically  Will refer to PT as she had some success with PT in the past  Patient also requesting referral to orthopedic surgery for consideration of CSI injections  Orders:    XR knee 3 vw left non injury; Future    Ambulatory Referral to Orthopedic Surgery; Future    EXTERNAL Referral to Home Health; Future    Diclofenac Sodium (VOLTAREN) 1 %; Apply 2 g topically 3 (three) times a day as needed (knee or hip pain)    Left hip pain    Orders:    XR hip/pelv 2-3 vws left if performed; Future    Diclofenac Sodium (VOLTAREN) 1 %; Apply 2 g topically 3 (three) times a day as needed (knee or hip pain)           History of Present Illness   Patient is a 69-year-old female presenting to the office for 5 to  "6-month follow-up  Labs reviewed with patient  Lipid panel: Total cholesterol 154, triglycerides 110, HDL 50, LDL 82  CMP: WNL  CBC: Leukocytosis resolved  TSH WNL    Patient does note increased pain of her left hip, foot, knee  She is currently following with Dr. Michael, podiatry for plantar fasciitis.  She recently had CSI injection of her ankle  Patient does have a history of osteoarthritis of the left knee  She denies previous surgeries to her hips or knees            Review of Systems   Constitutional:  Negative for chills, fatigue and fever.   HENT:  Negative for congestion, ear pain, postnasal drip, rhinorrhea, sinus pressure, sore throat and trouble swallowing.    Eyes:  Negative for pain and visual disturbance.   Respiratory:  Negative for cough, shortness of breath and wheezing.    Cardiovascular:  Negative for chest pain, palpitations and leg swelling.   Gastrointestinal:  Negative for abdominal pain, constipation, diarrhea, nausea and vomiting.   Genitourinary:  Negative for difficulty urinating, dysuria, frequency, hematuria and urgency.   Musculoskeletal:  Positive for arthralgias. Negative for back pain.   Neurological:  Negative for dizziness, weakness, light-headedness, numbness and headaches.   Psychiatric/Behavioral:  Negative for dysphoric mood and sleep disturbance. The patient is not nervous/anxious.    All other systems reviewed and are negative.      Objective   /76 (BP Location: Right arm, Patient Position: Sitting, Cuff Size: Adult)   Pulse 68   Temp 98.2 °F (36.8 °C)   Ht 5' 1\" (1.549 m)   Wt 82.1 kg (181 lb)   LMP  (LMP Unknown)   SpO2 97%   BMI 34.20 kg/m²      Physical Exam  Vitals and nursing note reviewed.   Constitutional:       General: She is not in acute distress.     Appearance: Normal appearance. She is not ill-appearing.   HENT:      Head: Normocephalic and atraumatic.      Right Ear: Tympanic membrane, ear canal and external ear normal.      Left Ear: Tympanic " membrane, ear canal and external ear normal.      Nose: Nose normal.      Mouth/Throat:      Mouth: Mucous membranes are moist.      Pharynx: Oropharynx is clear.   Eyes:      General: No scleral icterus.     Extraocular Movements: Extraocular movements intact.      Conjunctiva/sclera: Conjunctivae normal.      Pupils: Pupils are equal, round, and reactive to light.   Cardiovascular:      Rate and Rhythm: Normal rate and regular rhythm.      Heart sounds: Normal heart sounds. No murmur heard.     No friction rub. No gallop.   Pulmonary:      Effort: Pulmonary effort is normal. No respiratory distress.      Breath sounds: Normal breath sounds. No wheezing, rhonchi or rales.   Chest:      Chest wall: No tenderness.   Musculoskeletal:      Cervical back: No tenderness.      Left hip: Tenderness present.      Left knee: Tenderness present.      Right lower leg: No edema.      Left lower leg: No edema.   Lymphadenopathy:      Cervical: No cervical adenopathy.   Skin:     General: Skin is warm and dry.   Neurological:      Mental Status: She is alert and oriented to person, place, and time. Mental status is at baseline.      Gait: Gait abnormal (uses rollator).      Comments: Chronic L facial droop and L arm weakness   Psychiatric:         Mood and Affect: Mood normal.         Behavior: Behavior normal.           Disclaimer: This note was generated with voice recognition software.  Phonetic, grammatical, and spelling errors may be present as a result.  Please contact provider with any concerns or questions

## 2025-03-04 NOTE — ASSESSMENT & PLAN NOTE
Controlled with Zoloft 75 mg daily  Discussed referral to talk therapy to which patient declined

## 2025-03-04 NOTE — ASSESSMENT & PLAN NOTE
Continue vitamin D supplementation, increase to 2000IU daily for low normal Vitamin D    Orders:    Vitamin D 25 hydroxy; Future

## 2025-03-04 NOTE — ASSESSMENT & PLAN NOTE
Well-controlled with Gemtesa 75 mg daily.  Also has a history of implantable device to help control overactive bladder, done in [place]  Offered urology referral for follow-up with patient, she declined at this time  Orders:    Gemtesa 75 MG TABS; Take 75 mg by mouth in the morning

## 2025-03-04 NOTE — ASSESSMENT & PLAN NOTE
Follows with neurology  Current  treatment plan -   Preventative medications:  continue with quilpta, propanolol  Abortive medications: continue with ubrelvy 100mg as needed             Pt was contacted pt infomed of new appt and date time  ----- Message from Hannah sent at 10/17/2024  1:11 PM CDT -----  Regarding: self  Who Called: self    Who Left Message for Patient: was not noted    Does the patient know what this is regarding?: no    Would the patient rather a call back or a response via [facility]?  Call back    Best Call Back Number: [phone]      Additional Information: attempt to call office was made but no avail    Thank you.

## 2025-03-04 NOTE — ASSESSMENT & PLAN NOTE
Continue vitamin D and calcium supplementation  Continue Fosamax 70 mg weekly  Continue weightbearing exercise  Repeat DEXA scan.  Patient with mild osteoporosis in the left femoral neck which appears overall unchanged from previous  We will continue current regimen as above

## 2025-03-04 NOTE — ASSESSMENT & PLAN NOTE
Lipid panel: Total cholesterol 154, triglycerides 110, HDL 50, LDL 82  Improvement noted.  Continue atorvastatin 40 mg daily  Recommend healthy lifestyle choices for your cholesterol.  Low fat/low cholesterol diet.  Limit/avoid red meat.  Eat more lean meat - chicken breast, ground turkey, fish.  Exercise 30 mins at least 5 times a week as tolerated.

## 2025-03-04 NOTE — ASSESSMENT & PLAN NOTE
TSH WNL.  Continue Synthroid 75 mcg daily    Orders:    TSH, 3rd generation with Free T4 reflex; Future

## 2025-03-06 ENCOUNTER — OFFICE VISIT (OUTPATIENT)
Age: 70
End: 2025-03-06
Payer: COMMERCIAL

## 2025-03-06 ENCOUNTER — APPOINTMENT (OUTPATIENT)
Age: 70
End: 2025-03-06
Payer: COMMERCIAL

## 2025-03-06 DIAGNOSIS — M17.32 POST-TRAUMATIC OSTEOARTHRITIS OF LEFT KNEE: ICD-10-CM

## 2025-03-06 DIAGNOSIS — M17.32 POST-TRAUMATIC OSTEOARTHRITIS OF LEFT KNEE: Primary | ICD-10-CM

## 2025-03-06 DIAGNOSIS — M25.561 RIGHT KNEE PAIN, UNSPECIFIED CHRONICITY: ICD-10-CM

## 2025-03-06 DIAGNOSIS — G89.29 CHRONIC PAIN OF LEFT KNEE: ICD-10-CM

## 2025-03-06 DIAGNOSIS — M25.562 CHRONIC PAIN OF LEFT KNEE: ICD-10-CM

## 2025-03-06 PROCEDURE — 73560 X-RAY EXAM OF KNEE 1 OR 2: CPT

## 2025-03-06 PROCEDURE — 99204 OFFICE O/P NEW MOD 45 MIN: CPT | Performed by: ORTHOPAEDIC SURGERY

## 2025-03-06 PROCEDURE — 73564 X-RAY EXAM KNEE 4 OR MORE: CPT

## 2025-03-06 PROCEDURE — 20610 DRAIN/INJ JOINT/BURSA W/O US: CPT

## 2025-03-06 RX ORDER — TRIAMCINOLONE ACETONIDE 40 MG/ML
40 INJECTION, SUSPENSION INTRA-ARTICULAR; INTRAMUSCULAR
Status: COMPLETED | OUTPATIENT
Start: 2025-03-06 | End: 2025-03-06

## 2025-03-06 RX ORDER — LIDOCAINE HYDROCHLORIDE 10 MG/ML
4 INJECTION, SOLUTION INFILTRATION; PERINEURAL
Status: COMPLETED | OUTPATIENT
Start: 2025-03-06 | End: 2025-03-06

## 2025-03-06 RX ADMIN — LIDOCAINE HYDROCHLORIDE 4 ML: 10 INJECTION, SOLUTION INFILTRATION; PERINEURAL at 14:30

## 2025-03-06 RX ADMIN — TRIAMCINOLONE ACETONIDE 40 MG: 40 INJECTION, SUSPENSION INTRA-ARTICULAR; INTRAMUSCULAR at 14:30

## 2025-03-06 NOTE — ASSESSMENT & PLAN NOTE
Discussed treatment options  Home exercise program provided   Short hinge knee brace   CSI provided - patient tolerated procedure well   Follow up 3 months or PRN     Orders:    Ambulatory Referral to Orthopedic Surgery    XR knee 1 or 2 vw left; Future    Durable Medical Equipment

## 2025-03-06 NOTE — TELEPHONE ENCOUNTER
manolo from [employer] enrollment dept called regarding   Verification of chronic condition form that they have not received yet.   Advised that form was faxed on 2/26    They did not receive that fax.      Form can be faxed to [phone]    Form faxed to [phone]

## 2025-03-06 NOTE — PROGRESS NOTES
:  Assessment & Plan  Post-traumatic osteoarthritis of left knee  Discussed treatment options  Home exercise program provided   Short hinge knee brace   CSI provided - patient tolerated procedure well   Follow up 3 months or PRN     Orders:    Ambulatory Referral to Orthopedic Surgery    XR knee 1 or 2 vw left; Future    Durable Medical Equipment      Large joint arthrocentesis: L knee  Universal Protocol:  Consent: Verbal consent obtained.  Risks and benefits: risks, benefits and alternatives were discussed  Consent given by: patient  Patient understanding: patient states understanding of the procedure being performed  Site marked: the operative site was marked  Patient identity confirmed: verbally with patient  Supporting Documentation  Indications: pain   Procedure Details  Location: knee - L knee  Needle size: 22 G  Ultrasound guidance: no  Approach: anterolateral  Medications administered: 40 mg triamcinolone acetonide 40 mg/mL; 4 mL lidocaine 1 %    Patient tolerance: patient tolerated the procedure well with no immediate complications  Dressing:  Sterile dressing applied           REASON FOR VISIT:  Chief Complaint   Patient presents with    Left Hip - Pain    Left Knee - Pain         HISTORY OF PRESENT ILLNESS:  LEFT knee pain  Pain for a couple of years   Generalized pain throughout   No recent injuries   Pain is constant   Denies popping, clicking, swelling     Taking Tylenol PRN   No formal PT for knee  Been a few years since last CSI, provided relief     Hx of ACL reconstruction and meniscus repair about 20 years ago    LEFT hip  Having occasional pain with increased activity   Lateral hip pain   Denies weakness or decreased ROM     Past Medical History:   Diagnosis Date    Abnormal Pap smear of cervix     Allergic     Arthritis     Decreased consciousness     Depression     Disease of thyroid gland     Dysuria     Endometrial hyperplasia     Foot pain     (soft tissue)-last assessed-10/10/2013     Gastric ulcer     last assessed-2/16/2012    Headache(784.0)     Hyperlipidemia     Hypertension     Migraine     Mild cervical dysplasia     Moderate dysplasia of cervix (KORTNEY II)     10/2005 colposcopy    Senile atrophic vaginitis     Simple endometrial hyperplasia without atypia     11/5/2004    Stroke (HCC) 2019        Past Surgical History:   Procedure Laterality Date    APPENDECTOMY  1976    COLONOSCOPY      (fiberoptic) screening-last assessed-10/10/2013    DILATION AND CURETTAGE OF UTERUS  11/2004    dilation and curettage of cervical stump    ESOPHAGOGASTRODUODENOSCOPY  02/1998    diagnostic    GYNECOLOGIC CRYOSURGERY  11/2005    cervix cryosurgery    KNEE ARTHROSCOPY  02/2004    (therapeutic)    TUBAL LIGATION  1982       Social History     Socioeconomic History    Marital status: Legally      Spouse name: Not on file    Number of children: 3    Years of education: Not on file    Highest education level: Not on file   Occupational History    Not on file   Tobacco Use    Smoking status: Never    Smokeless tobacco: Never   Vaping Use    Vaping status: Never Used   Substance and Sexual Activity    Alcohol use: Not Currently     Alcohol/week: 1.0 standard drink of alcohol     Types: 1 Standard drinks or equivalent per week     Comment: social    Drug use: Never    Sexual activity: Not Currently     Partners: Male     Birth control/protection: None   Other Topics Concern    Not on file   Social History Narrative    Drinks caffeinated tea-drinks 1 cup of hot tea a day, drinks 2 oz glasses of iced tea a day    No Zoroastrianism beliefs     Social Drivers of Health     Financial Resource Strain: Low Risk  (9/20/2019)    Received from [facility]    Overall Financial Resource Strain (CARDIA)     Difficulty of Paying Living Expenses: Not hard at all   Food Insecurity: No Food Insecurity (9/18/2024)    Nursing - Inadequate Food Risk Classification     Worried About Running Out of Food in the Last Year: Never true      Ran Out of Food in the Last Year: Never true     Ran Out of Food in the Last Year: Not on file   Transportation Needs: No Transportation Needs (9/18/2024)    PRAPARE - Transportation     Lack of Transportation (Medical): No     Lack of Transportation (Non-Medical): No   Recent Concern: Transportation Needs - Unmet Transportation Needs (9/17/2024)    PRAPARE - Transportation     Lack of Transportation (Medical): Yes     Lack of Transportation (Non-Medical): Yes   Physical Activity: Not on file   Stress: Not on file   Social Connections: Not on file   Intimate Partner Violence: Not on file   Housing Stability: High Risk (9/18/2024)    Housing Stability Vital Sign     Unable to Pay for Housing in the Last Year: No     Number of Times Moved in the Last Year: 2     Homeless in the Last Year: No       MEDICATIONS:    Current Outpatient Medications:     Acetaminophen 500 MG, Take 1 capsule by mouth every 4 (four) hours as needed for mild pain, Disp: , Rfl:     alendronate (FOSAMAX) 70 mg tablet, Take 1 tablet (70 mg total) by mouth every 7 days TAKE 1 TABLET (70 MG TOTAL) BY MOUTH ONCE A WEEK TAKE IN THE MORNING WITH A FULL GLASS OF WATER, ON AN EMPTY STOMACH, AND DO NOT TAKE ANYTHING ELSE BY MOUTH OR LIE DOWN FOR THE NEXT 30 MIN, Disp: 12 tablet, Rfl: 1    amLODIPine (NORVASC) 5 mg tablet, Take 1 tablet (5 mg total) by mouth daily, Disp: 90 tablet, Rfl: 1    ascorbic acid (VITAMIN C) 250 MG tablet, Take 250 mg by mouth daily, Disp: , Rfl:     aspirin (ECOTRIN LOW STRENGTH) 81 mg EC tablet, Take 81 mg by mouth daily, Disp: , Rfl:     atorvastatin (LIPITOR) 40 mg tablet, Take 1 tablet (40 mg total) by mouth daily, Disp: 100 tablet, Rfl: 1    B Complex-Folic Acid (B COMPLEX VITAMINS, W/ FA, PO), Take 1 capsule by mouth daily, Disp: , Rfl:     calcium carbonate-vitamin D 500 mg-5 mcg per tablet, Take 1 tablet by mouth daily with breakfast, Disp: , Rfl:     cholecalciferol (VITAMIN D3) 1,000 units tablet, Take 2 capsules  by mouth daily, Disp: , Rfl:     Diclofenac Sodium (VOLTAREN) 1 %, Apply 2 g topically 3 (three) times a day as needed (knee or hip pain), Disp: 100 g, Rfl: 1    Gemtesa 75 MG TABS, Take 75 mg by mouth in the morning, Disp: 90 tablet, Rfl: 1    Magnesium 250 MG TABS, Take 1 tablet by mouth daily, Disp: , Rfl:     mometasone (ELOCON) 0.1 % cream, Apply topically daily, Disp: 45 g, Rfl: 0    Multiple Vitamins-Minerals (WOMENS ONE DAILY PO), Take 1 tablet by mouth daily, Disp: , Rfl:     pantoprazole (PROTONIX) 20 mg tablet, Take 1 tablet (20 mg total) by mouth daily, Disp: 30 tablet, Rfl: 5    propranolol (INDERAL) 60 mg tablet, Take 1 tablet (60 mg total) by mouth 2 (two) times a day, Disp: 60 tablet, Rfl: 3    Qulipta 60 MG TABS, Take 60 mg by mouth daily, Disp: 30 tablet, Rfl: 5    saccharomyces boulardii (FLORASTOR) 250 mg capsule, Take 1 capsule (250 mg total) by mouth 2 (two) times a day, Disp: 60 capsule, Rfl: 5    sertraline (ZOLOFT) 25 mg tablet, Take 1 tablet (25 mg total) by mouth daily, Disp: 90 tablet, Rfl: 1    sertraline (ZOLOFT) 50 mg tablet, Take 1 tablet (50 mg total) by mouth daily, Disp: 90 tablet, Rfl: 1    Synthroid 75 MCG tablet, Take 1 tablet (75 mcg total) by mouth daily, Disp: 90 tablet, Rfl: 1    Ubrogepant (UBRELVY) 100 MG tablet, Take 1 tablet (100 mg total) by mouth if needed (migraine) can repeat dose in two hours if needed. Do not take more than two doses in 24 hour period., Disp: 15 tablet, Rfl: 2    ALLERGIES:  Allergies   Allergen Reactions    Magnesium Sulfate-Potassium Cl-Sodium Sulfate Hives and Itching    Banana Extract Allergy Skin Test - Food Allergy Swelling    Latex Hives     Other Reaction(s): Unknown    Banana - Food Allergy Hives and Throat Swelling     Other reaction(s): Itching    Lisinopril Cough    Other Allergic Rhinitis     Seasonal    Sulfa Antibiotics Hives         MAJOR FINDINGS:  Quynh Larios is pleasant, healthy appearing, and in no acute distress.     LEFT  knee  Skin intact, ROM 0-0-130   Trace effusion  Normal patella tracking   No patella apprehension  Joint line tenderness: lateral and medial, Mercedes test: negative  Anterior drawer: mild translation , Lachman: 2, Posterior drawer: negative   Stable to varus/valgus  Sensation intact sp/dp/t  Strength intact 5/5 dorsiflexion/plantarflexion/SLR   Extremity wwp  No calf pain      RIGHT knee  Skin intact, ROM 0-0-130   No effusion  Normal patella tracking   No patella apprehension  Joint line tenderness: medial, Mercedes test: negative  Anterior drawer: negative, Lachman: stable, Posterior drawer: negative   Stable to varus/valgus  Sensation intact sp/dp/t  Strength intact 5/5 dorsiflexion/plantarflexion/SLR   Extremity wwp  No calf pain      IMAGING  I personally reviewed and interpreted the imaging studies  X-rays performed on the BILATERAL knees show signs of mild/moderate arthritis with mild medial space narrowing in the left, there is also retained ACL metallic fixation hardware on the left      CC:  LIN Hope Nicole, PA-C

## 2025-03-07 ENCOUNTER — RESULTS FOLLOW-UP (OUTPATIENT)
Age: 70
End: 2025-03-07

## 2025-03-07 DIAGNOSIS — G89.29 CHRONIC PAIN OF LEFT KNEE: Primary | ICD-10-CM

## 2025-03-07 DIAGNOSIS — M25.562 CHRONIC PAIN OF LEFT KNEE: Primary | ICD-10-CM

## 2025-03-07 NOTE — PROGRESS NOTES
Rachel Vines, RN  Humaira Allen, LIN Gerardo  I am so sorry but due to our current capacity we can not accept this patient.  I see she ws not in network for [facility].. She can go outpatient to any [facility] out patient or you will need to send this referral to another HH agency  If you need help finding a HH agency contact outpatient case management as they can assist My apology      . [facility] unable to accept patient as they are at capacity.  Will refer to case management to find alternative within patient's network per Rachel's recommendation.  She is not in network with [facility].  Patient needs in-home therapy as she currently resides in an independent living facility and does not drive

## 2025-03-12 ENCOUNTER — PATIENT OUTREACH (OUTPATIENT)
Dept: CASE MANAGEMENT | Facility: OTHER | Age: 70
End: 2025-03-12

## 2025-03-12 NOTE — PROGRESS NOTES
Received referral from patients PCP Humaira Allen PA-C requesting that [facility] outreach patient and assess for needs. Per chart review, patient was referred to [facility] for PT, however [facility] is at capacity. Patient unable to go to OP PT as she does not drive and resides in independent living facility. [facility] not in network with patients insurance.     Attempted outreaching patient, no answer and left message for return call.

## 2025-03-18 ENCOUNTER — PATIENT OUTREACH (OUTPATIENT)
Dept: CASE MANAGEMENT | Facility: OTHER | Age: 70
End: 2025-03-18

## 2025-03-18 NOTE — PROGRESS NOTES
2nd attempt outreaching patient to assess for needs. Per chart review, patient was referred to [facility] for PT, however [facility] is at capacity. Patient unable to go to OP PT as she does not drive and resides in independent living facility. [facility] not in network with patients insurance. No answer and left message. UTR letter was sent.

## 2025-03-18 NOTE — LETTER
[address] [facility] [address]  [phone]    Re: Unable to reach    3/18/2025       Dear Quynh,    I am a [facility]  and wanted to be certain you had information to contact me should you desire assistance with or have questions about non-medical aspects of your care such as [but not limited to] medical insurance, housing, transportation, material needs, or emergency needs.  If I do not have an answer I will assist you in finding the appropriate agency or individual who can help.      Please feel free to contact me at Dept: [phone]. Thank You.    Sincerely,         Rhonda Lara

## 2025-03-24 ENCOUNTER — OFFICE VISIT (OUTPATIENT)
Dept: INTERNAL MEDICINE CLINIC | Facility: OTHER | Age: 70
End: 2025-03-24
Payer: COMMERCIAL

## 2025-03-24 VITALS
SYSTOLIC BLOOD PRESSURE: 130 MMHG | BODY MASS INDEX: 33.53 KG/M2 | HEIGHT: 61 IN | WEIGHT: 177.6 LBS | DIASTOLIC BLOOD PRESSURE: 88 MMHG | OXYGEN SATURATION: 96 % | HEART RATE: 85 BPM | TEMPERATURE: 97.5 F

## 2025-03-24 DIAGNOSIS — R05.1 ACUTE COUGH: ICD-10-CM

## 2025-03-24 DIAGNOSIS — I10 ESSENTIAL HYPERTENSION: ICD-10-CM

## 2025-03-24 DIAGNOSIS — J10.1 INFLUENZA A: Primary | ICD-10-CM

## 2025-03-24 DIAGNOSIS — E78.2 MIXED HYPERLIPIDEMIA: ICD-10-CM

## 2025-03-24 LAB
SARS-COV-2 AG UPPER RESP QL IA: NEGATIVE
SL AMB POCT RAPID FLU A: POSITIVE
SL AMB POCT RAPID FLU B: NORMAL
VALID CONTROL: NORMAL

## 2025-03-24 PROCEDURE — 87804 INFLUENZA ASSAY W/OPTIC: CPT | Performed by: INTERNAL MEDICINE

## 2025-03-24 PROCEDURE — G2211 COMPLEX E/M VISIT ADD ON: HCPCS | Performed by: INTERNAL MEDICINE

## 2025-03-24 PROCEDURE — 99213 OFFICE O/P EST LOW 20 MIN: CPT | Performed by: INTERNAL MEDICINE

## 2025-03-24 PROCEDURE — 87811 SARS-COV-2 COVID19 W/OPTIC: CPT | Performed by: INTERNAL MEDICINE

## 2025-03-24 RX ORDER — OSELTAMIVIR PHOSPHATE 75 MG/1
75 CAPSULE ORAL EVERY 12 HOURS SCHEDULED
Qty: 10 CAPSULE | Refills: 0 | Status: SHIPPED | OUTPATIENT
Start: 2025-03-24 | End: 2025-03-29

## 2025-03-24 RX ORDER — ALBUTEROL SULFATE 90 UG/1
2 INHALANT RESPIRATORY (INHALATION) 3 TIMES DAILY PRN
Status: DISCONTINUED | OUTPATIENT
Start: 2025-03-24 | End: 2025-03-25

## 2025-03-24 RX ORDER — SERTRALINE HYDROCHLORIDE 25 MG/1
25 TABLET, FILM COATED ORAL DAILY
Qty: 90 TABLET | Refills: 1 | Status: CANCELLED | OUTPATIENT
Start: 2025-03-24 | End: 2026-03-19

## 2025-03-24 RX ORDER — METHYLPREDNISOLONE 4 MG/1
TABLET ORAL
COMMUNITY
Start: 2025-03-17 | End: 2025-03-27 | Stop reason: ALTCHOICE

## 2025-03-24 RX ORDER — GUAIFENESIN 600 MG/1
600 TABLET, EXTENDED RELEASE ORAL EVERY 12 HOURS PRN
Qty: 14 TABLET | Refills: 0 | Status: SHIPPED | OUTPATIENT
Start: 2025-03-24

## 2025-03-24 NOTE — PROGRESS NOTES
Name: Quynh Larios      : 1955      MRN: [id number]  Encounter Provider: Glenna Vargas DO  Encounter Date: 3/24/2025   Encounter department: [facility]  :  Assessment & Plan  Influenza A  Patient presented to the office for same day visit. Patient reports congestion that started on Friday, 3/21. She reports she isolated herself at this point out of concern. She has been progressively feeling worse which prompted her visit today. Patient lives at [facility], she is unsure if she had contact with anyone sick, but reports many residents who are sick.  Patient with subjective fevers, cough with clear phlegm, wheezing, and feeling weak. Patient took tylenol prior to visit.  Influenza A positive    Plan:   Tamiflu 75 mg PO BID for 5 days  Mucinex 600 mg BID PRN for cough  Albuterol inhaler 2 puff TID PRN for wheezing   Patient educated on wearing a mask around others, and she will speak with her facility about isolation rules.  Patient educated on symptomatic management for her flu symptoms    Orders:    guaiFENesin (MUCINEX) 600 mg 12 hr tablet; Take 1 tablet (600 mg total) by mouth every 12 (twelve) hours as needed for cough    albuterol (PROVENTIL HFA,VENTOLIN HFA) inhaler 2 puff    oseltamivir (TAMIFLU) 75 mg capsule; Take 1 capsule (75 mg total) by mouth every 12 (twelve) hours for 5 days    Acute cough  Patient presented with acute cough and congestion. Patient tried using dayquil, but continued to feel worse prompting her visit.  Plan:   See Influenza A  Orders:    albuterol (PROVENTIL HFA,VENTOLIN HFA) inhaler 2 puff    POCT Rapid Covid Ag    POCT rapid flu A and B    Essential hypertension  Patient with PMHx of hypertension. Patient reports compliance with all of her medications. She does not require any refills at this time. /88 at today's visit.    Plan:   Continue amlodipine 5 mg        Mixed hyperlipidemia  Patient with history  of CVA with residual hemiparesis. No history of cardiac events, CAD. Patient maintained on high intensity statin therapy. On last lipid panel, LDL of 82. Would consider repeat lipid panel at next follow up appointment.     Plan:   Continue atorvastatin 40 mg daily  Patient was educated on lifestyle modifications for lowering LDL  Patient may benefit from atorvastatin 80 mg daily to bring LDL <70 which is targeted therapy s/p CVA, will discuss with patient for 6 month follow up.              History of Present Illness   Quynh Larios is a 69 y.o. female with PMHx of CVA with residual L sided weakness, HLD, HTN, hypothyroidism, OA of left hip and knee, MDD, HLD. Patient presents for same day visit for concern of cough, congestion, and wheezing. Patient reports congestion that started on Friday, 3/21. She reports she isolated herself at this point out of concern. She has been progressively feeling worse which prompted her visit today. Patient lives at [facility], she is unsure if she had contact with anyone sick, but reports many residents who are sick. Patient with subjective fevers, cough with clear phlegm, wheezing, and feeling weak. Patient took tylenol prior to visit. Patient denies history of asthma, nonsmoking. Patient denies chills, shortness of breath, chest pain, palpitations, nausea, myalgias, vomiting, diarrhea, constipation.       Review of Systems   Constitutional:  Positive for activity change, fatigue and fever. Negative for chills.   HENT:  Positive for congestion. Negative for sinus pain and sore throat.    Respiratory:  Positive for cough and wheezing. Negative for chest tightness and shortness of breath.    Cardiovascular:  Negative for chest pain and palpitations.   Gastrointestinal:  Negative for abdominal pain, constipation, diarrhea, nausea and vomiting.   Musculoskeletal:  Negative for myalgias.   Neurological:  Negative for weakness, light-headedness and headaches.  "      Objective   /88 (BP Location: Left arm, Patient Position: Sitting, Cuff Size: Standard)   Pulse 85   Temp 97.5 °F (36.4 °C) (Temporal)   Ht 5' 1\" (1.549 m)   Wt 80.6 kg (177 lb 9.6 oz)   LMP  (LMP Unknown)   SpO2 96%   BMI 33.56 kg/m²      Physical Exam  Vitals reviewed.   Constitutional:       Appearance: She is ill-appearing.   HENT:      Nose: Congestion present.      Mouth/Throat:      Mouth: Mucous membranes are moist.   Cardiovascular:      Rate and Rhythm: Normal rate and regular rhythm.      Pulses: Normal pulses.      Heart sounds: Normal heart sounds.   Pulmonary:      Breath sounds: No decreased air movement. Rhonchi present. No wheezing or rales.      Comments: Ronchi present, patient able to clear phlegm.  Abdominal:      General: Abdomen is flat.      Palpations: Abdomen is soft.   Musculoskeletal:      Right lower leg: No edema.      Left lower leg: No edema.   Skin:     General: Skin is warm and dry.   Neurological:      Mental Status: She is oriented to person, place, and time. Mental status is at baseline.           Glenna Vargas, DO   Internal Medicine Residency PGY-1   [facility]    "

## 2025-03-24 NOTE — ASSESSMENT & PLAN NOTE
Patient with history of CVA with residual hemiparesis. No history of cardiac events, CAD. Patient maintained on high intensity statin therapy. On last lipid panel, LDL of 82. Would consider repeat lipid panel at next follow up appointment.     Plan:   Continue atorvastatin 40 mg daily  Patient was educated on lifestyle modifications for lowering LDL  Patient may benefit from atorvastatin 80 mg daily to bring LDL <70 which is targeted therapy s/p CVA, will discuss with patient for 6 month follow up.

## 2025-03-24 NOTE — ASSESSMENT & PLAN NOTE
Patient with PMHx of hypertension. Patient reports compliance with all of her medications. She does not require any refills at this time. /88 at today's visit.    Plan:   Continue amlodipine 5 mg

## 2025-03-24 NOTE — PATIENT INSTRUCTIONS
Please take tamilfu twice a day for 5 days.   We have ordered you mucinex 600 mg twice a day as needed for cough and congestion, as well as albuterol inhaler 2 puffs up to three times a day as needed for wheezing.

## 2025-03-25 ENCOUNTER — TELEPHONE (OUTPATIENT)
Dept: INTERNAL MEDICINE CLINIC | Facility: OTHER | Age: 70
End: 2025-03-25

## 2025-03-25 DIAGNOSIS — F32.1 CURRENT MODERATE EPISODE OF MAJOR DEPRESSIVE DISORDER WITHOUT PRIOR EPISODE (HCC): ICD-10-CM

## 2025-03-25 DIAGNOSIS — J10.1 INFLUENZA A: Primary | ICD-10-CM

## 2025-03-25 RX ORDER — ALBUTEROL SULFATE 90 UG/1
2 INHALANT RESPIRATORY (INHALATION) 3 TIMES DAILY PRN
Qty: 8.5 G | Refills: 0 | Status: SHIPPED | OUTPATIENT
Start: 2025-03-25 | End: 2025-03-27

## 2025-03-25 RX ORDER — SERTRALINE HYDROCHLORIDE 25 MG/1
25 TABLET, FILM COATED ORAL DAILY
Qty: 90 TABLET | Refills: 1 | Status: SHIPPED | OUTPATIENT
Start: 2025-03-25 | End: 2026-03-20

## 2025-03-25 NOTE — TELEPHONE ENCOUNTER
Please resend inhaler pharmacy states they did not receive looks like it was put in as in office administer

## 2025-03-25 NOTE — TELEPHONE ENCOUNTER
Reason for call:   [x] Refill   [] Prior Auth  [] Other:     Office:   [x] PCP/Provider - Humaira Allen  [] Specialty/Provider -     Medication: Sertraline    Dose/Frequency: 25 mg  Daily     Quantity: 90    Pharmacy: [facility]   Does the patient have enough for 3 days?   [] Yes   [x] No - Send as HP to POD    Mail Away Pharmacy   Does the patient have enough for 10 days?   [] Yes   [] No - Send as HP to POD

## 2025-03-25 NOTE — TELEPHONE ENCOUNTER
Patient went to [facility] pharmacy only 2 of 3 prescription where ready please resend Albuterol inhaler does not show sent or on active medication list [facility] - [place], [place] - [address] [phone]

## 2025-03-27 ENCOUNTER — OFFICE VISIT (OUTPATIENT)
Dept: INTERNAL MEDICINE CLINIC | Facility: OTHER | Age: 70
End: 2025-03-27
Payer: COMMERCIAL

## 2025-03-27 VITALS
HEIGHT: 61 IN | SYSTOLIC BLOOD PRESSURE: 120 MMHG | WEIGHT: 177 LBS | OXYGEN SATURATION: 97 % | DIASTOLIC BLOOD PRESSURE: 82 MMHG | TEMPERATURE: 97.6 F | BODY MASS INDEX: 33.42 KG/M2 | HEART RATE: 67 BPM

## 2025-03-27 DIAGNOSIS — I10 ESSENTIAL HYPERTENSION: ICD-10-CM

## 2025-03-27 DIAGNOSIS — J10.1 INFLUENZA A: Primary | ICD-10-CM

## 2025-03-27 PROCEDURE — G2211 COMPLEX E/M VISIT ADD ON: HCPCS

## 2025-03-27 PROCEDURE — 99213 OFFICE O/P EST LOW 20 MIN: CPT

## 2025-03-27 RX ORDER — ALBUTEROL SULFATE 0.83 MG/ML
2.5 SOLUTION RESPIRATORY (INHALATION) EVERY 6 HOURS PRN
Qty: 180 ML | Refills: 1 | Status: SHIPPED | OUTPATIENT
Start: 2025-03-27

## 2025-03-27 RX ORDER — FLUTICASONE PROPIONATE 50 MCG
1 SPRAY, SUSPENSION (ML) NASAL 2 TIMES DAILY
Qty: 11.1 ML | Refills: 1 | Status: SHIPPED | OUTPATIENT
Start: 2025-03-27

## 2025-03-27 RX ORDER — SODIUM CHLORIDE FOR INHALATION 0.9 %
3 VIAL, NEBULIZER (ML) INHALATION EVERY 4 HOURS PRN
Qty: 90 ML | Refills: 0 | Status: SHIPPED | OUTPATIENT
Start: 2025-03-27

## 2025-03-27 NOTE — PROGRESS NOTES
Name: Quynh Larios      : 1955      MRN: [id number]  Encounter Provider: Humaira Allen PA-C  Encounter Date: 3/27/2025   Encounter department: [facility]  :  Assessment & Plan  Influenza A  Continue Tamiflu to completion  Continue Mucinex twice daily, Delsym as needed  Will order albuterol nebulizer rather than inhaler to be used every 6 hours as needed for wheezing or shortness of breath  Saline nebulizer ordered to be used intermittently  Will add Flonase twice daily  Add flonase nasal spray to rule out pneumonia, patient defers at this time  Discussed course of symptoms could be 1 to 2 weeks  Follow-up 1 week or sooner symptoms worsen    Orders:    fluticasone (FLONASE) 50 mcg/act nasal spray; 1 spray into each nostril 2 (two) times a day    Nebulizer    albuterol (2.5 mg/3 mL) 0.083 % nebulizer solution; Take 3 mL (2.5 mg total) by nebulization every 6 (six) hours as needed for wheezing or shortness of breath    sodium chloride 0.9 % nebulizer solution; Take 3 mL by nebulization every 4 (four) hours as needed for wheezing    Nebulizer Supplies    Essential hypertension  Controlled with amlodipine 5 mg daily and propranolol 60 mg twice daily  Continue low-salt diet, monitoring blood pressure at home                 Depression Screening and Follow-up Plan: Patient was screened for depression during today's encounter. They screened negative with a PHQ-9 score of 3.        History of Present Illness         Patient is a 69-year-old female presenting to the office for continued flulike symptoms  She was seen on 3/24/2025 and diagnosed with influenza A  She was treated with Tamiflu, which she has been compliant with  She has also been using Mucinex twice daily, tylenol, delsym  She reports the albuterol was not filled by the pharmacy until yesterday   Patient was on prednisone for foot pain, completed Saturday or   Patient endorses that she is still having  "productive cough, wheezing, occasional shortness of breath  She has now developed ear pressure    URI   This is a recurrent problem. The current episode started in the past 7 days. The problem has been unchanged. There has been no fever. Associated symptoms include chest pain, congestion, coughing, headaches, a plugged ear sensation, rhinorrhea, a sore throat and wheezing. Pertinent negatives include no abdominal pain, diarrhea, ear pain, nausea, rash, sinus pain or vomiting.     Review of Systems   Constitutional:  Positive for fatigue and fever. Negative for chills.   HENT:  Positive for congestion, postnasal drip, rhinorrhea, sinus pressure and sore throat. Negative for ear pain, sinus pain and trouble swallowing.    Eyes:  Negative for discharge and redness.   Respiratory:  Positive for cough, shortness of breath and wheezing.    Cardiovascular:  Positive for chest pain. Negative for palpitations.   Gastrointestinal:  Negative for abdominal pain, diarrhea, nausea and vomiting.   Skin:  Negative for rash.   Neurological:  Positive for headaches. Negative for dizziness and light-headedness.       Objective   /82 (BP Location: Right arm, Patient Position: Sitting, Cuff Size: Adult)   Pulse 67   Temp 97.6 °F (36.4 °C) (Temporal)   Ht 5' 1\" (1.549 m)   Wt 80.3 kg (177 lb)   LMP  (LMP Unknown)   SpO2 97%   BMI 33.44 kg/m²      Physical Exam  Vitals and nursing note reviewed.   Constitutional:       General: She is not in acute distress.     Appearance: Normal appearance. She is not ill-appearing.   HENT:      Head: Normocephalic and atraumatic.      Right Ear: Ear canal and external ear normal. A middle ear effusion is present. Tympanic membrane is not erythematous.      Left Ear: Ear canal and external ear normal. A middle ear effusion is present. Tympanic membrane is not erythematous.      Nose: Congestion present.      Mouth/Throat:      Mouth: Mucous membranes are moist.      Pharynx: Oropharynx is " clear. No oropharyngeal exudate or posterior oropharyngeal erythema.   Eyes:      General: No scleral icterus.        Right eye: No discharge.         Left eye: No discharge.      Conjunctiva/sclera: Conjunctivae normal.      Pupils: Pupils are equal, round, and reactive to light.   Cardiovascular:      Rate and Rhythm: Normal rate and regular rhythm.      Heart sounds: Normal heart sounds. No murmur heard.     No friction rub. No gallop.   Pulmonary:      Effort: Pulmonary effort is normal. No respiratory distress.      Breath sounds: Normal breath sounds. No wheezing, rhonchi or rales.   Chest:      Chest wall: No tenderness.   Musculoskeletal:      Cervical back: Normal range of motion. No tenderness.      Right lower leg: No edema.      Left lower leg: No edema.   Lymphadenopathy:      Cervical: No cervical adenopathy.   Skin:     General: Skin is warm and dry.      Coloration: Skin is not pale.      Findings: No erythema.   Neurological:      Mental Status: She is alert and oriented to person, place, and time. Mental status is at baseline.      Gait: Gait abnormal (walker).      Comments: Chronic L facial droop and L arm weakness    Psychiatric:         Mood and Affect: Mood normal.         Behavior: Behavior normal.           Disclaimer: This note was generated with voice recognition software.  Phonetic, grammatical, and spelling errors may be present as a result.  Please contact provider with any concerns or questions

## 2025-03-27 NOTE — ASSESSMENT & PLAN NOTE
Continue Tamiflu to completion  Continue Mucinex twice daily, Delsym as needed  Will order albuterol nebulizer rather than inhaler to be used every 6 hours as needed for wheezing or shortness of breath  Saline nebulizer ordered to be used intermittently  Will add Flonase twice daily  Add flonase nasal spray to rule out pneumonia, patient defers at this time  Discussed course of symptoms could be 1 to 2 weeks  Follow-up 1 week or sooner symptoms worsen    Orders:    fluticasone (FLONASE) 50 mcg/act nasal spray; 1 spray into each nostril 2 (two) times a day    Nebulizer    albuterol (2.5 mg/3 mL) 0.083 % nebulizer solution; Take 3 mL (2.5 mg total) by nebulization every 6 (six) hours as needed for wheezing or shortness of breath    sodium chloride 0.9 % nebulizer solution; Take 3 mL by nebulization every 4 (four) hours as needed for wheezing    Nebulizer Supplies

## 2025-04-04 DIAGNOSIS — E03.9 HYPOTHYROIDISM, UNSPECIFIED TYPE: ICD-10-CM

## 2025-04-04 RX ORDER — LEVOTHYROXINE SODIUM 75 MCG
75 TABLET ORAL DAILY
Qty: 90 TABLET | Refills: 1 | Status: SHIPPED | OUTPATIENT
Start: 2025-04-04

## 2025-04-26 PROBLEM — J10.1 INFLUENZA A: Status: RESOLVED | Noted: 2025-03-27 | Resolved: 2025-04-26

## 2025-04-28 ENCOUNTER — TELEPHONE (OUTPATIENT)
Dept: INTERNAL MEDICINE CLINIC | Facility: OTHER | Age: 70
End: 2025-04-28

## 2025-04-28 DIAGNOSIS — E78.2 MIXED HYPERLIPIDEMIA: ICD-10-CM

## 2025-04-28 NOTE — TELEPHONE ENCOUNTER
Code has been updated and faxed back to [facility].  Updated form has been scanned into patients chart.

## 2025-04-28 NOTE — TELEPHONE ENCOUNTER
Received fax from [facility] to update or change diagnosis code. Paper scanned under this encounter and placed on Humaira's desk

## 2025-04-29 RX ORDER — ATORVASTATIN CALCIUM 40 MG/1
TABLET, FILM COATED ORAL
Qty: 30 TABLET | Refills: 5 | Status: SHIPPED | OUTPATIENT
Start: 2025-04-29

## 2025-05-27 DIAGNOSIS — G43.009 MIGRAINE WITHOUT AURA AND WITHOUT STATUS MIGRAINOSUS, NOT INTRACTABLE: ICD-10-CM

## 2025-05-27 NOTE — TELEPHONE ENCOUNTER
Reason for call:   [x] Refill   [] Prior Auth  [] Other:     Office:   [] PCP/Provider -   [x] Specialty/Provider - Fabiola BAZAN     Medication: Qulipta    Dose/Frequency: 60 mg    Quantity: #30    Pharmacy: [facility]   Does the patient have enough for 3 days?   [] Yes   [x] No - Send as HP to POD    Mail Away Pharmacy   Does the patient have enough for 10 days?   [] Yes   [] No - Send as HP to POD

## 2025-05-28 RX ORDER — ATOGEPANT 60 MG/1
60 TABLET ORAL DAILY
Qty: 30 TABLET | Refills: 5 | Status: SHIPPED | OUTPATIENT
Start: 2025-05-28

## 2025-05-29 DIAGNOSIS — G43.009 MIGRAINE WITHOUT AURA AND WITHOUT STATUS MIGRAINOSUS, NOT INTRACTABLE: ICD-10-CM

## 2025-05-29 NOTE — TELEPHONE ENCOUNTER
Reason for call:   [x] Refill   [] Prior Auth  [] Other:     Office:   [] PCP/Provider -   [x] Specialty/Provider - [facility]  Authorized By: Fabiola Mann MD    Medication:   Ubrogepant (UBRELVY) 100 MG tablet 100 mg, As needed         Pharmacy: [address] [phone]     Local Pharmacy   Does the patient have enough for 3 days?   [] Yes   [x] No - Send as HP to POD    Mail Away Pharmacy   Does the patient have enough for 10 days?   [] Yes   [] No - Send as HP to POD

## 2025-06-05 ENCOUNTER — TELEPHONE (OUTPATIENT)
Dept: NEUROLOGY | Facility: CLINIC | Age: 70
End: 2025-06-05

## 2025-06-05 DIAGNOSIS — G43.009 MIGRAINE WITHOUT AURA AND WITHOUT STATUS MIGRAINOSUS, NOT INTRACTABLE: ICD-10-CM

## 2025-06-05 NOTE — TELEPHONE ENCOUNTER
Reason for call:   [x] Prior Auth  [] Other:     Caller:  [] Patient  [x] Pharmacy  Name:   Address:   Callback Number:       Ordering Provider:   [] PCP/Provider -   [x] Speciality/Provider - [facility]     Has the patient tried other medications and failed? If failed, which medications did they fail?    [] No   [] Yes -     Is the patient's insurance updated in EPIC?   [] Yes   [] No     Is a copy of the patient's insurance scanned in EPIC?   [] Yes   [] No

## 2025-06-06 ENCOUNTER — OFFICE VISIT (OUTPATIENT)
Age: 70
End: 2025-06-06
Payer: COMMERCIAL

## 2025-06-06 VITALS — HEIGHT: 61 IN | WEIGHT: 177 LBS | BODY MASS INDEX: 33.42 KG/M2

## 2025-06-06 DIAGNOSIS — G89.29 CHRONIC PAIN OF LEFT KNEE: ICD-10-CM

## 2025-06-06 DIAGNOSIS — M17.12 ARTHRITIS OF LEFT KNEE: Primary | ICD-10-CM

## 2025-06-06 DIAGNOSIS — M25.562 CHRONIC PAIN OF LEFT KNEE: ICD-10-CM

## 2025-06-06 DIAGNOSIS — M17.32 POST-TRAUMATIC OSTEOARTHRITIS OF LEFT KNEE: ICD-10-CM

## 2025-06-06 PROCEDURE — 99213 OFFICE O/P EST LOW 20 MIN: CPT | Performed by: ORTHOPAEDIC SURGERY

## 2025-06-06 RX ORDER — MELOXICAM 7.5 MG/1
7.5 TABLET ORAL DAILY
Qty: 15 TABLET | Refills: 0 | Status: SHIPPED | OUTPATIENT
Start: 2025-06-06

## 2025-06-06 NOTE — TELEPHONE ENCOUNTER
Ubrogepant (UBRELVY) 100 MG tablet, was sent to the [facility] - [address] on 5/29/25 with a qty of 15 tablets with 2 refills.

## 2025-06-06 NOTE — ASSESSMENT & PLAN NOTE
Orders:    meloxicam (Mobic) 7.5 mg tablet; Take 1 tablet (7.5 mg total) by mouth daily    Injection Procedure Prior Authorization; Future

## 2025-06-06 NOTE — PROGRESS NOTES
:  Assessment & Plan  Post-traumatic osteoarthritis of left knee    Orders:    meloxicam (Mobic) 7.5 mg tablet; Take 1 tablet (7.5 mg total) by mouth daily    Injection Procedure Prior Authorization; Future      LEFT knee arthritis, possible stress reaction, possible meniscus pathology   Discussed treatment options  Tried knee brace but hard to put on, due her hand arthritis.   Tried home therapy program without relief   Tried steroid injection without relief  Mobic prescribed  MRI LEFT knee to assess for stress reaction, assess meniscus     Consider gel injections  Up to 10 out of 10 sometimes          Left knee osteoarthritis and effusion  LEFT knee visco-supplement was ordered. Patient has on-going knee pain and stiffness in which interferes with their daily activity and sleep. Has associated  crepitus with range of motion on exam and significant osteoarthritic changes on radiograph. That patient rates their pain as 10/10 at times. The patient has tried home exercise program, steroid injections, activity modifications, oral medications with limited benefit. Brace has not provided relief.          REASON FOR VISIT:  Chief Complaint   Patient presents with    Left Hip - Follow-up    Left Knee - Follow-up     Interval Hx 6/6/25:   Pain persistent, tried home therapy, tried steroid injection and anti inflammatories without relief.         HISTORY OF PRESENT ILLNESS:  LEFT knee pain  Pain for a couple of years   Generalized pain throughout   No recent injuries   Pain is constant   Denies popping, clicking, swelling     Taking Tylenol PRN   No formal PT for knee  Been a few years since last CSI, provided relief     Hx of ACL reconstruction and meniscus repair about 20 years ago    LEFT hip  Having occasional pain with increased activity   Lateral hip pain   Denies weakness or decreased ROM     Past Medical History:   Diagnosis Date    Abnormal Pap smear of cervix     Allergic     Arthritis     Decreased consciousness      Depression     Disease of thyroid gland     Dysuria     Endometrial hyperplasia     Foot pain     (soft tissue)-last assessed-10/10/2013    Gastric ulcer     last assessed-2/16/2012    Headache(784.0)     Hyperlipidemia     Hypertension     Migraine     Mild cervical dysplasia     Moderate dysplasia of cervix (KORTNEY II)     10/2005 colposcopy    Senile atrophic vaginitis     Simple endometrial hyperplasia without atypia     11/5/2004    Stroke (HCC) 2019        Past Surgical History:   Procedure Laterality Date    APPENDECTOMY  1976    COLONOSCOPY      (fiberoptic) screening-last assessed-10/10/2013    DILATION AND CURETTAGE OF UTERUS  11/2004    dilation and curettage of cervical stump    ESOPHAGOGASTRODUODENOSCOPY  02/1998    diagnostic    GYNECOLOGIC CRYOSURGERY  11/2005    cervix cryosurgery    KNEE ARTHROSCOPY  02/2004    (therapeutic)    TUBAL LIGATION  1982       Social History     Socioeconomic History    Marital status: Legally      Spouse name: Not on file    Number of children: 3    Years of education: Not on file    Highest education level: Not on file   Occupational History    Not on file   Tobacco Use    Smoking status: Never    Smokeless tobacco: Never   Vaping Use    Vaping status: Never Used   Substance and Sexual Activity    Alcohol use: Not Currently     Alcohol/week: 1.0 standard drink of alcohol     Types: 1 Standard drinks or equivalent per week     Comment: social    Drug use: Never    Sexual activity: Not Currently     Partners: Male     Birth control/protection: None   Other Topics Concern    Not on file   Social History Narrative    Drinks caffeinated tea-drinks 1 cup of hot tea a day, drinks 2 oz glasses of iced tea a day    No Restorationist beliefs     Social Drivers of Health     Financial Resource Strain: Low Risk  (9/20/2019)    Received from [facility]    Overall Financial Resource Strain (CARDIA)     Difficulty of Paying Living Expenses: Not hard at all   Food Insecurity: No  Food Insecurity (9/18/2024)    Nursing - Inadequate Food Risk Classification     Worried About Running Out of Food in the Last Year: Never true     Ran Out of Food in the Last Year: Never true     Ran Out of Food in the Last Year: Not on file   Transportation Needs: No Transportation Needs (9/18/2024)    PRAPARE - Transportation     Lack of Transportation (Medical): No     Lack of Transportation (Non-Medical): No   Recent Concern: Transportation Needs - Unmet Transportation Needs (9/17/2024)    PRAPARE - Transportation     Lack of Transportation (Medical): Yes     Lack of Transportation (Non-Medical): Yes   Physical Activity: Not on file   Stress: Not on file   Social Connections: Not on file   Intimate Partner Violence: Not on file   Housing Stability: High Risk (9/18/2024)    Housing Stability Vital Sign     Unable to Pay for Housing in the Last Year: No     Number of Times Moved in the Last Year: 2     Homeless in the Last Year: No       MEDICATIONS:    Current Outpatient Medications:     Acetaminophen 500 MG, Take 1 capsule by mouth every 4 (four) hours as needed for mild pain, Disp: , Rfl:     albuterol (2.5 mg/3 mL) 0.083 % nebulizer solution, Take 3 mL (2.5 mg total) by nebulization every 6 (six) hours as needed for wheezing or shortness of breath, Disp: 180 mL, Rfl: 1    alendronate (FOSAMAX) 70 mg tablet, Take 1 tablet (70 mg total) by mouth every 7 days TAKE 1 TABLET (70 MG TOTAL) BY MOUTH ONCE A WEEK TAKE IN THE MORNING WITH A FULL GLASS OF WATER, ON AN EMPTY STOMACH, AND DO NOT TAKE ANYTHING ELSE BY MOUTH OR LIE DOWN FOR THE NEXT 30 MIN, Disp: 12 tablet, Rfl: 1    amLODIPine (NORVASC) 5 mg tablet, Take 1 tablet (5 mg total) by mouth daily, Disp: 90 tablet, Rfl: 1    ascorbic acid (VITAMIN C) 250 MG tablet, Take 250 mg by mouth in the morning., Disp: , Rfl:     aspirin (ECOTRIN LOW STRENGTH) 81 mg EC tablet, Take 81 mg by mouth in the morning., Disp: , Rfl:     atorvastatin (LIPITOR) 40 mg tablet, TAKE 1  TABLET BY MOUTH DAILY @5PM (CHOLESTEROL), Disp: 30 tablet, Rfl: 5    B Complex-Folic Acid (B COMPLEX VITAMINS, W/ FA, PO), Take 1 capsule by mouth in the morning., Disp: , Rfl:     calcium carbonate-vitamin D 500 mg-5 mcg per tablet, Take 1 tablet by mouth daily with breakfast, Disp: , Rfl:     cholecalciferol (VITAMIN D3) 1,000 units tablet, Take 2 capsules by mouth in the morning., Disp: , Rfl:     Diclofenac Sodium (VOLTAREN) 1 %, Apply 2 g topically 3 (three) times a day as needed (knee or hip pain), Disp: 100 g, Rfl: 1    fluticasone (FLONASE) 50 mcg/act nasal spray, 1 spray into each nostril 2 (two) times a day, Disp: 11.1 mL, Rfl: 1    Gemtesa 75 MG TABS, Take 75 mg by mouth in the morning, Disp: 90 tablet, Rfl: 1    guaiFENesin (MUCINEX) 600 mg 12 hr tablet, Take 1 tablet (600 mg total) by mouth every 12 (twelve) hours as needed for cough, Disp: 14 tablet, Rfl: 0    Magnesium 250 MG TABS, Take 1 tablet by mouth in the morning., Disp: , Rfl:     mometasone (ELOCON) 0.1 % cream, Apply topically daily, Disp: 45 g, Rfl: 0    Multiple Vitamins-Minerals (WOMENS ONE DAILY PO), Take 1 tablet by mouth in the morning., Disp: , Rfl:     pantoprazole (PROTONIX) 20 mg tablet, Take 1 tablet (20 mg total) by mouth daily, Disp: 30 tablet, Rfl: 5    propranolol (INDERAL) 60 mg tablet, Take 1 tablet (60 mg total) by mouth 2 (two) times a day, Disp: 60 tablet, Rfl: 3    Qulipta 60 MG TABS, Take 60 mg by mouth daily, Disp: 30 tablet, Rfl: 5    saccharomyces boulardii (FLORASTOR) 250 mg capsule, Take 1 capsule (250 mg total) by mouth 2 (two) times a day, Disp: 60 capsule, Rfl: 5    sertraline (ZOLOFT) 25 mg tablet, Take 1 tablet (25 mg total) by mouth daily, Disp: 90 tablet, Rfl: 1    sertraline (ZOLOFT) 50 mg tablet, Take 1 tablet (50 mg total) by mouth daily, Disp: 90 tablet, Rfl: 1    sodium chloride 0.9 % nebulizer solution, Take 3 mL by nebulization every 4 (four) hours as needed for wheezing, Disp: 90 mL, Rfl: 0     Synthroid 75 MCG tablet, Take 1 tablet (75 mcg total) by mouth daily, Disp: 90 tablet, Rfl: 1    Ubrogepant (UBRELVY) 100 MG tablet, Take 1 tablet (100 mg total) by mouth if needed (migraine) can repeat dose in two hours if needed. Do not take more than two doses in 24 hour period., Disp: 15 tablet, Rfl: 2    ALLERGIES:  Allergies   Allergen Reactions    Magnesium Sulfate-Potassium Cl-Sodium Sulfate Hives and Itching    Banana Extract Allergy Skin Test - Food Allergy Swelling    Latex Hives     Other Reaction(s): Unknown    Banana - Food Allergy Hives and Throat Swelling     Other reaction(s): Itching    Lisinopril Cough    Other Allergic Rhinitis     Seasonal    Sulfa Antibiotics Hives         MAJOR FINDINGS:  Quynh Herb is pleasant, healthy appearing, and in no acute distress.     LEFT knee  Skin intact, ROM 0-0-130   Trace effusion  Normal patella tracking   No patella apprehension  Joint line tenderness: lateral and medial, Mercedes test: negative  Anterior drawer: mild translation , Lachman: 2, Posterior drawer: negative   Stable to varus/valgus  Sensation intact sp/dp/t  Strength intact 5/5 dorsiflexion/plantarflexion/SLR   Extremity wwp  No calf pain          IMAGING  I personally reviewed and interpreted the imaging studies  X-rays performed on the BILATERAL knees show signs of mild/moderate arthritis with mild medial space narrowing in the left, there is also retained ACL metallic fixation hardware on the left      CC:  Humaira Allen PA-C No ref. provider found

## 2025-06-07 NOTE — TELEPHONE ENCOUNTER
Prior auth for Ubrelvy submitted through CM, [id number]. Awaiting determination.    PA Nurse: please check on status of determination.

## 2025-06-09 NOTE — TELEPHONE ENCOUNTER
Farhan GUTIÉRREZ approved from 3/1/25 to 12/31/25. Please see below:    - Outcome:  Approved on June 7 by [employer] NCPDP 2017  Your request has been approved  Effective Date: 3/1/2025  Authorization Expiration Date: 12/31/2025    Called pharmacy to make aware and left a VM with a call back # if any further assistance.

## 2025-06-23 NOTE — QUICK NOTE
Investigation Report    Device investigated: [employer]  SCS Model:                    1101   Leads info:                     1201         Method investigated   imaging report  vendor contacted     EPIC   website used     Time spent investigating in minutes: 15    Investigation findings: conditional    Risk vs Benefit performed.  If so, list physician and outcome: N/A       Sarecycline Counseling: Patient advised regarding possible photosensitivity and discoloration of the teeth, skin, lips, tongue and gums.  Patient instructed to avoid sunlight, if possible.  When exposed to sunlight, patients should wear protective clothing, sunglasses, and sunscreen.  The patient was instructed to call the office immediately if the following severe adverse effects occur:  hearing changes, easy bruising/bleeding, severe headache, or vision changes.  The patient verbalized understanding of the proper use and possible adverse effects of sarecycline.  All of the patient's questions and concerns were addressed.

## 2025-06-26 ENCOUNTER — HOSPITAL ENCOUNTER (OUTPATIENT)
Dept: RADIOLOGY | Facility: IMAGING CENTER | Age: 70
End: 2025-06-26
Attending: ORTHOPAEDIC SURGERY
Payer: COMMERCIAL

## 2025-06-26 DIAGNOSIS — G89.29 CHRONIC PAIN OF LEFT KNEE: ICD-10-CM

## 2025-06-26 DIAGNOSIS — M25.562 CHRONIC PAIN OF LEFT KNEE: ICD-10-CM

## 2025-06-26 DIAGNOSIS — Z96.82 SACRAL NERVE STIMULATOR PRESENT: ICD-10-CM

## 2025-06-26 PROCEDURE — 73721 MRI JNT OF LWR EXTRE W/O DYE: CPT

## 2025-06-26 PROCEDURE — 76014 MR SFTY IMPLT&/FB ASMT STF 1: CPT

## 2025-06-28 DIAGNOSIS — F32.1 CURRENT MODERATE EPISODE OF MAJOR DEPRESSIVE DISORDER WITHOUT PRIOR EPISODE (HCC): ICD-10-CM

## 2025-07-01 ENCOUNTER — TELEPHONE (OUTPATIENT)
Dept: NEUROLOGY | Facility: CLINIC | Age: 70
End: 2025-07-01

## 2025-07-01 NOTE — TELEPHONE ENCOUNTER
Lvm to notify provider is unable to come into the office and  will need to r.s her next appt. Instructed pt to call us back as soon possible, thank you.

## 2025-07-02 NOTE — TELEPHONE ENCOUNTER
Pt called back and r/s appt for 11/12/2025 @ 9am. Pt did express about how far away that appt was. Appt was added to wait list.

## 2025-07-03 ENCOUNTER — OFFICE VISIT (OUTPATIENT)
Age: 70
End: 2025-07-03

## 2025-07-03 VITALS — WEIGHT: 177 LBS | BODY MASS INDEX: 33.42 KG/M2 | HEIGHT: 61 IN

## 2025-07-03 DIAGNOSIS — M25.562 CHRONIC PAIN OF LEFT KNEE: ICD-10-CM

## 2025-07-03 DIAGNOSIS — G89.29 CHRONIC PAIN OF LEFT KNEE: ICD-10-CM

## 2025-07-03 DIAGNOSIS — M17.12 ARTHRITIS OF LEFT KNEE: Primary | ICD-10-CM

## 2025-07-03 NOTE — PROGRESS NOTES
:  Assessment & Plan  Arthritis of left knee      LEFT knee arthritis  Discussed treatment options  MRI reviewed with patient today, no signs of new mensicus injury, ACL reconstruction intact   Tried knee brace but hard to put on, due her hand arthritis.   Tried home therapy program without relief   Tried steroid injection without relief  Mobic prescribed at last visit, no relief   Gel injection (Durolane) provided today in the office - patient tolerated procedure well, can apply ice over the next 24-48 hours   Follow up in 2 months         Large joint arthrocentesis: L knee    Performed by: Elvira Anderson PA-C  Authorized by: Merrick Zamora MD    Universal Protocol:  Consent: Verbal consent obtained  Risks and benefits: risks, benefits and alternatives were discussed  Consent given by: patient  Patient understanding: patient states understanding of the procedure being performed  Site marked: the operative site was marked  Patient identity confirmed: verbally with patient  Supporting Documentation  Indications: pain     Is this a Visco injection? Yes  Non-Pharmacologic Treatments Attempted: Home Exercise  Pharmacologic Treatments Attempted: NSAIDs (Mobic)   Pain Score: 10Procedure Details  Location: knee - L knee  Needle size: 22 G  Ultrasound guidance: no  Approach: anterolateral  Medications administered: 3 mL sodium hyaluronate 60 MG/3ML    Patient tolerance: patient tolerated the procedure well with no immediate complications  Dressing:  Sterile dressing applied             REASON FOR VISIT:  Chief Complaint   Patient presents with    Left Knee - Follow-up       Interval Hx 7/3/25  Continues to have pain, primarily on the lateral/anterior knee   Mobic has not been helpful       Interval Hx 6/6/25:   Pain persistent, tried home therapy, tried steroid injection and anti inflammatories without relief.       HISTORY OF PRESENT ILLNESS:  LEFT knee pain  Pain for a couple of years   Generalized pain throughout   No  recent injuries   Pain is constant   Denies popping, clicking, swelling     Taking Tylenol PRN   No formal PT for knee  Been a few years since last CSI, provided relief     Hx of ACL reconstruction and meniscus repair about 20 years ago    LEFT hip  Having occasional pain with increased activity   Lateral hip pain   Denies weakness or decreased ROM     Past Medical History:   Diagnosis Date    Abnormal Pap smear of cervix     Allergic     Arthritis     Decreased consciousness     Depression     Disease of thyroid gland     Dysuria     Endometrial hyperplasia     Foot pain     (soft tissue)-last assessed-10/10/2013    Gastric ulcer     last assessed-2/16/2012    Headache(784.0)     Hyperlipidemia     Hypertension     Migraine     Mild cervical dysplasia     Moderate dysplasia of cervix (KORTNEY II)     10/2005 colposcopy    Senile atrophic vaginitis     Simple endometrial hyperplasia without atypia     11/5/2004    Stroke (HCC) 2019        Past Surgical History:   Procedure Laterality Date    APPENDECTOMY  1976    COLONOSCOPY      (fiberoptic) screening-last assessed-10/10/2013    DILATION AND CURETTAGE OF UTERUS  11/2004    dilation and curettage of cervical stump    ESOPHAGOGASTRODUODENOSCOPY  02/1998    diagnostic    GYNECOLOGIC CRYOSURGERY  11/2005    cervix cryosurgery    KNEE ARTHROSCOPY  02/2004    (therapeutic)    TUBAL LIGATION  1982       Social History     Socioeconomic History    Marital status: Legally      Spouse name: Not on file    Number of children: 3    Years of education: Not on file    Highest education level: Not on file   Occupational History    Not on file   Tobacco Use    Smoking status: Never    Smokeless tobacco: Never   Vaping Use    Vaping status: Never Used   Substance and Sexual Activity    Alcohol use: Not Currently     Alcohol/week: 1.0 standard drink of alcohol     Types: 1 Standard drinks or equivalent per week     Comment: social    Drug use: Never    Sexual activity: Not  Currently     Partners: Male     Birth control/protection: None   Other Topics Concern    Not on file   Social History Narrative    Drinks caffeinated tea-drinks 1 cup of hot tea a day, drinks 2 oz glasses of iced tea a day    No Yazidi beliefs     Social Drivers of Health     Financial Resource Strain: Low Risk  (9/20/2019)    Received from [facility]    Overall Financial Resource Strain (CARDIA)     Difficulty of Paying Living Expenses: Not hard at all   Food Insecurity: No Food Insecurity (9/18/2024)    Nursing - Inadequate Food Risk Classification     Worried About Running Out of Food in the Last Year: Never true     Ran Out of Food in the Last Year: Never true     Ran Out of Food in the Last Year: Not on file   Transportation Needs: No Transportation Needs (9/18/2024)    PRAPARE - Transportation     Lack of Transportation (Medical): No     Lack of Transportation (Non-Medical): No   Recent Concern: Transportation Needs - Unmet Transportation Needs (9/17/2024)    PRAPARE - Transportation     Lack of Transportation (Medical): Yes     Lack of Transportation (Non-Medical): Yes   Physical Activity: Not on file   Stress: Not on file   Social Connections: Not on file   Intimate Partner Violence: Not on file   Housing Stability: High Risk (9/18/2024)    Housing Stability Vital Sign     Unable to Pay for Housing in the Last Year: No     Number of Times Moved in the Last Year: 2     Homeless in the Last Year: No       MEDICATIONS:    Current Outpatient Medications:     Acetaminophen 500 MG, Take 1 capsule by mouth every 4 (four) hours as needed for mild pain, Disp: , Rfl:     albuterol (2.5 mg/3 mL) 0.083 % nebulizer solution, Take 3 mL (2.5 mg total) by nebulization every 6 (six) hours as needed for wheezing or shortness of breath, Disp: 180 mL, Rfl: 1    alendronate (FOSAMAX) 70 mg tablet, Take 1 tablet (70 mg total) by mouth every 7 days TAKE 1 TABLET (70 MG TOTAL) BY MOUTH ONCE A WEEK TAKE IN THE MORNING WITH A  FULL GLASS OF WATER, ON AN EMPTY STOMACH, AND DO NOT TAKE ANYTHING ELSE BY MOUTH OR LIE DOWN FOR THE NEXT 30 MIN, Disp: 12 tablet, Rfl: 1    amLODIPine (NORVASC) 5 mg tablet, Take 1 tablet (5 mg total) by mouth daily, Disp: 90 tablet, Rfl: 1    ascorbic acid (VITAMIN C) 250 MG tablet, Take 250 mg by mouth in the morning., Disp: , Rfl:     aspirin (ECOTRIN LOW STRENGTH) 81 mg EC tablet, Take 81 mg by mouth in the morning., Disp: , Rfl:     atorvastatin (LIPITOR) 40 mg tablet, TAKE 1 TABLET BY MOUTH DAILY @5PM (CHOLESTEROL), Disp: 30 tablet, Rfl: 5    B Complex-Folic Acid (B COMPLEX VITAMINS, W/ FA, PO), Take 1 capsule by mouth in the morning., Disp: , Rfl:     calcium carbonate-vitamin D 500 mg-5 mcg per tablet, Take 1 tablet by mouth daily with breakfast, Disp: , Rfl:     cholecalciferol (VITAMIN D3) 1,000 units tablet, Take 2 capsules by mouth in the morning., Disp: , Rfl:     Diclofenac Sodium (VOLTAREN) 1 %, Apply 2 g topically 3 (three) times a day as needed (knee or hip pain), Disp: 100 g, Rfl: 1    fluticasone (FLONASE) 50 mcg/act nasal spray, 1 spray into each nostril 2 (two) times a day, Disp: 11.1 mL, Rfl: 1    Gemtesa 75 MG TABS, Take 75 mg by mouth in the morning, Disp: 90 tablet, Rfl: 1    guaiFENesin (MUCINEX) 600 mg 12 hr tablet, Take 1 tablet (600 mg total) by mouth every 12 (twelve) hours as needed for cough, Disp: 14 tablet, Rfl: 0    Magnesium 250 MG TABS, Take 1 tablet by mouth in the morning., Disp: , Rfl:     meloxicam (Mobic) 7.5 mg tablet, Take 1 tablet (7.5 mg total) by mouth daily, Disp: 15 tablet, Rfl: 0    mometasone (ELOCON) 0.1 % cream, Apply topically daily, Disp: 45 g, Rfl: 0    Multiple Vitamins-Minerals (WOMENS ONE DAILY PO), Take 1 tablet by mouth in the morning., Disp: , Rfl:     pantoprazole (PROTONIX) 20 mg tablet, Take 1 tablet (20 mg total) by mouth daily, Disp: 30 tablet, Rfl: 5    propranolol (INDERAL) 60 mg tablet, Take 1 tablet (60 mg total) by mouth 2 (two) times a day,  Disp: 60 tablet, Rfl: 3    Qulipta 60 MG TABS, Take 60 mg by mouth daily, Disp: 30 tablet, Rfl: 5    saccharomyces boulardii (FLORASTOR) 250 mg capsule, Take 1 capsule (250 mg total) by mouth 2 (two) times a day, Disp: 60 capsule, Rfl: 5    sertraline (ZOLOFT) 25 mg tablet, Take 1 tablet (25 mg total) by mouth daily, Disp: 90 tablet, Rfl: 1    sertraline (ZOLOFT) 50 mg tablet, Take 1 tablet (50 mg total) by mouth daily, Disp: 90 tablet, Rfl: 1    sodium chloride 0.9 % nebulizer solution, Take 3 mL by nebulization every 4 (four) hours as needed for wheezing, Disp: 90 mL, Rfl: 0    Synthroid 75 MCG tablet, Take 1 tablet (75 mcg total) by mouth daily, Disp: 90 tablet, Rfl: 1    Ubrogepant (UBRELVY) 100 MG tablet, Take 1 tablet (100 mg total) by mouth if needed (migraine) can repeat dose in two hours if needed. Do not take more than two doses in 24 hour period., Disp: 15 tablet, Rfl: 2    ALLERGIES:  Allergies   Allergen Reactions    Magnesium Sulfate-Potassium Cl-Sodium Sulfate Hives and Itching    Banana Extract Allergy Skin Test - Food Allergy Swelling    Latex Hives     Other Reaction(s): Unknown    Banana - Food Allergy Hives and Throat Swelling     Other reaction(s): Itching    Lisinopril Cough    Other Allergic Rhinitis     Seasonal    Sulfa Antibiotics Hives         MAJOR FINDINGS:  Quynh Larios is pleasant, healthy appearing, and in no acute distress.     LEFT knee  Skin intact, ROM 0-0-130   Trace effusion  Normal patella tracking   No patella apprehension  Joint line tenderness: lateral and medial, Mercedes test: negative  Anterior drawer: mild translation , Lachman: 2, Posterior drawer: negative   Stable to varus/valgus  Sensation intact sp/dp/t  Strength intact 5/5 dorsiflexion/plantarflexion/SLR   Extremity wwp  No calf pain        IMAGING  I personally reviewed and interpreted the imaging studies  X-rays performed on the BILATERAL knees show signs of mild/moderate arthritis with mild medial space narrowing  in the left, there is also retained ACL metallic fixation hardware on the left      MRI LEFT knee 6/26/25:   1.  Status post total ACL reconstruction with an intact graft.  2.  Diminutive appearance to the medial meniscus suggestive of prior partial meniscectomy without residual or recurrent tear. Lateral meniscus remains intact.  3.  Grade II chondromalacia patella.      CC:  LIN Hope Matthew, MD

## 2025-07-15 ENCOUNTER — OFFICE VISIT (OUTPATIENT)
Dept: NEUROLOGY | Facility: CLINIC | Age: 70
End: 2025-07-15
Payer: COMMERCIAL

## 2025-07-15 VITALS
SYSTOLIC BLOOD PRESSURE: 116 MMHG | TEMPERATURE: 96.4 F | WEIGHT: 180.4 LBS | HEART RATE: 66 BPM | OXYGEN SATURATION: 96 % | BODY MASS INDEX: 34.06 KG/M2 | HEIGHT: 61 IN | DIASTOLIC BLOOD PRESSURE: 84 MMHG

## 2025-07-15 DIAGNOSIS — I61.0 BASAL GANGLIA HEMORRHAGE (HCC): ICD-10-CM

## 2025-07-15 DIAGNOSIS — I69.154 HEMIPARESIS OF LEFT NONDOMINANT SIDE AS LATE EFFECT OF NONTRAUMATIC INTRACEREBRAL HEMORRHAGE (HCC): Primary | ICD-10-CM

## 2025-07-15 DIAGNOSIS — R51.9 HEADACHE: ICD-10-CM

## 2025-07-15 DIAGNOSIS — G43.009 MIGRAINE WITHOUT AURA AND WITHOUT STATUS MIGRAINOSUS, NOT INTRACTABLE: ICD-10-CM

## 2025-07-15 PROCEDURE — 99215 OFFICE O/P EST HI 40 MIN: CPT | Performed by: PSYCHIATRY & NEUROLOGY

## 2025-07-15 PROCEDURE — G2211 COMPLEX E/M VISIT ADD ON: HCPCS | Performed by: PSYCHIATRY & NEUROLOGY
